# Patient Record
Sex: MALE | Race: BLACK OR AFRICAN AMERICAN | NOT HISPANIC OR LATINO | URBAN - METROPOLITAN AREA
[De-identification: names, ages, dates, MRNs, and addresses within clinical notes are randomized per-mention and may not be internally consistent; named-entity substitution may affect disease eponyms.]

---

## 2017-04-28 ENCOUNTER — OUTPATIENT (OUTPATIENT)
Dept: OUTPATIENT SERVICES | Facility: HOSPITAL | Age: 77
LOS: 1 days | End: 2017-04-28
Payer: MEDICARE

## 2017-04-28 DIAGNOSIS — Z51.89 ENCOUNTER FOR OTHER SPECIFIED AFTERCARE: ICD-10-CM

## 2017-04-28 DIAGNOSIS — M47.816 SPONDYLOSIS WITHOUT MYELOPATHY OR RADICULOPATHY, LUMBAR REGION: ICD-10-CM

## 2017-04-28 DIAGNOSIS — Z98.89 OTHER SPECIFIED POSTPROCEDURAL STATES: Chronic | ICD-10-CM

## 2017-06-16 PROCEDURE — 97010 HOT OR COLD PACKS THERAPY: CPT | Mod: KX

## 2017-06-16 PROCEDURE — 97163 PT EVAL HIGH COMPLEX 45 MIN: CPT

## 2017-06-16 PROCEDURE — G8980: CPT | Mod: CJ

## 2017-06-16 PROCEDURE — 97140 MANUAL THERAPY 1/> REGIONS: CPT | Mod: KX

## 2017-06-16 PROCEDURE — G8979: CPT | Mod: CI

## 2017-06-16 PROCEDURE — 97110 THERAPEUTIC EXERCISES: CPT | Mod: KX

## 2017-06-16 PROCEDURE — G8978: CPT | Mod: CJ

## 2018-02-25 ENCOUNTER — EMERGENCY (EMERGENCY)
Facility: HOSPITAL | Age: 78
LOS: 1 days | Discharge: HOME | End: 2018-02-25
Attending: EMERGENCY MEDICINE | Admitting: EMERGENCY MEDICINE
Payer: MEDICARE

## 2018-02-25 VITALS
RESPIRATION RATE: 18 BRPM | HEART RATE: 70 BPM | OXYGEN SATURATION: 97 % | SYSTOLIC BLOOD PRESSURE: 174 MMHG | TEMPERATURE: 98 F | DIASTOLIC BLOOD PRESSURE: 81 MMHG

## 2018-02-25 VITALS — WEIGHT: 149.91 LBS | HEIGHT: 69 IN

## 2018-02-25 DIAGNOSIS — Z98.89 OTHER SPECIFIED POSTPROCEDURAL STATES: Chronic | ICD-10-CM

## 2018-02-25 LAB
ALBUMIN SERPL ELPH-MCNC: 4.3 G/DL — SIGNIFICANT CHANGE UP (ref 3.3–5.2)
ALP SERPL-CCNC: 80 U/L — SIGNIFICANT CHANGE UP (ref 40–120)
ALT FLD-CCNC: 12 U/L — SIGNIFICANT CHANGE UP
ANION GAP SERPL CALC-SCNC: 10 MMOL/L — SIGNIFICANT CHANGE UP (ref 5–17)
APPEARANCE UR: CLEAR — SIGNIFICANT CHANGE UP
APTT BLD: 39.1 SEC — HIGH (ref 27.5–37.4)
AST SERPL-CCNC: 23 U/L — SIGNIFICANT CHANGE UP
BASOPHILS # BLD AUTO: 0 K/UL — SIGNIFICANT CHANGE UP (ref 0–0.2)
BASOPHILS NFR BLD AUTO: 0.3 % — SIGNIFICANT CHANGE UP (ref 0–2)
BILIRUB SERPL-MCNC: 0.3 MG/DL — LOW (ref 0.4–2)
BILIRUB UR-MCNC: NEGATIVE — SIGNIFICANT CHANGE UP
BLD GP AB SCN SERPL QL: SIGNIFICANT CHANGE UP
BUN SERPL-MCNC: 16 MG/DL — SIGNIFICANT CHANGE UP (ref 8–20)
CALCIUM SERPL-MCNC: 9.9 MG/DL — SIGNIFICANT CHANGE UP (ref 8.6–10.2)
CHLORIDE SERPL-SCNC: 99 MMOL/L — SIGNIFICANT CHANGE UP (ref 98–107)
CO2 SERPL-SCNC: 30 MMOL/L — HIGH (ref 22–29)
COLOR SPEC: YELLOW — SIGNIFICANT CHANGE UP
CREAT SERPL-MCNC: 1.02 MG/DL — SIGNIFICANT CHANGE UP (ref 0.5–1.3)
DIFF PNL FLD: NEGATIVE — SIGNIFICANT CHANGE UP
EOSINOPHIL # BLD AUTO: 0.1 K/UL — SIGNIFICANT CHANGE UP (ref 0–0.5)
EOSINOPHIL NFR BLD AUTO: 1.9 % — SIGNIFICANT CHANGE UP (ref 0–6)
GLUCOSE SERPL-MCNC: 100 MG/DL — SIGNIFICANT CHANGE UP (ref 70–115)
GLUCOSE UR QL: NEGATIVE MG/DL — SIGNIFICANT CHANGE UP
HCT VFR BLD CALC: 46.5 % — SIGNIFICANT CHANGE UP (ref 42–52)
HGB BLD-MCNC: 15.2 G/DL — SIGNIFICANT CHANGE UP (ref 14–18)
INR BLD: 1.09 RATIO — SIGNIFICANT CHANGE UP (ref 0.88–1.16)
KETONES UR-MCNC: NEGATIVE — SIGNIFICANT CHANGE UP
LEUKOCYTE ESTERASE UR-ACNC: NEGATIVE — SIGNIFICANT CHANGE UP
LIDOCAIN IGE QN: 38 U/L — SIGNIFICANT CHANGE UP (ref 22–51)
LYMPHOCYTES # BLD AUTO: 2.2 K/UL — SIGNIFICANT CHANGE UP (ref 1–4.8)
LYMPHOCYTES # BLD AUTO: 37.6 % — SIGNIFICANT CHANGE UP (ref 20–55)
MCHC RBC-ENTMCNC: 29.1 PG — SIGNIFICANT CHANGE UP (ref 27–31)
MCHC RBC-ENTMCNC: 32.7 G/DL — SIGNIFICANT CHANGE UP (ref 32–36)
MCV RBC AUTO: 89.1 FL — SIGNIFICANT CHANGE UP (ref 80–94)
MONOCYTES # BLD AUTO: 0.6 K/UL — SIGNIFICANT CHANGE UP (ref 0–0.8)
MONOCYTES NFR BLD AUTO: 10.5 % — HIGH (ref 3–10)
NEUTROPHILS # BLD AUTO: 2.9 K/UL — SIGNIFICANT CHANGE UP (ref 1.8–8)
NEUTROPHILS NFR BLD AUTO: 49.7 % — SIGNIFICANT CHANGE UP (ref 37–73)
NITRITE UR-MCNC: NEGATIVE — SIGNIFICANT CHANGE UP
PH UR: 7 — SIGNIFICANT CHANGE UP (ref 5–8)
PLATELET # BLD AUTO: 448 K/UL — HIGH (ref 150–400)
POTASSIUM SERPL-MCNC: 5.1 MMOL/L — SIGNIFICANT CHANGE UP (ref 3.5–5.3)
POTASSIUM SERPL-SCNC: 5.1 MMOL/L — SIGNIFICANT CHANGE UP (ref 3.5–5.3)
PROT SERPL-MCNC: 8.1 G/DL — SIGNIFICANT CHANGE UP (ref 6.6–8.7)
PROT UR-MCNC: NEGATIVE MG/DL — SIGNIFICANT CHANGE UP
PROTHROM AB SERPL-ACNC: 12 SEC — SIGNIFICANT CHANGE UP (ref 9.8–12.7)
RBC # BLD: 5.22 M/UL — SIGNIFICANT CHANGE UP (ref 4.6–6.2)
RBC # FLD: 15 % — SIGNIFICANT CHANGE UP (ref 11–15.6)
SODIUM SERPL-SCNC: 139 MMOL/L — SIGNIFICANT CHANGE UP (ref 135–145)
SP GR SPEC: 1 — LOW (ref 1.01–1.02)
TYPE + AB SCN PNL BLD: SIGNIFICANT CHANGE UP
UROBILINOGEN FLD QL: NEGATIVE MG/DL — SIGNIFICANT CHANGE UP
WBC # BLD: 5.8 K/UL — SIGNIFICANT CHANGE UP (ref 4.8–10.8)
WBC # FLD AUTO: 5.8 K/UL — SIGNIFICANT CHANGE UP (ref 4.8–10.8)

## 2018-02-25 PROCEDURE — 85027 COMPLETE CBC AUTOMATED: CPT

## 2018-02-25 PROCEDURE — 36415 COLL VENOUS BLD VENIPUNCTURE: CPT

## 2018-02-25 PROCEDURE — 83690 ASSAY OF LIPASE: CPT

## 2018-02-25 PROCEDURE — 99284 EMERGENCY DEPT VISIT MOD MDM: CPT

## 2018-02-25 PROCEDURE — 71250 CT THORAX DX C-: CPT

## 2018-02-25 PROCEDURE — 99284 EMERGENCY DEPT VISIT MOD MDM: CPT | Mod: 25

## 2018-02-25 PROCEDURE — 74176 CT ABD & PELVIS W/O CONTRAST: CPT

## 2018-02-25 PROCEDURE — 71250 CT THORAX DX C-: CPT | Mod: 26

## 2018-02-25 PROCEDURE — 86901 BLOOD TYPING SEROLOGIC RH(D): CPT

## 2018-02-25 PROCEDURE — 85610 PROTHROMBIN TIME: CPT

## 2018-02-25 PROCEDURE — 85730 THROMBOPLASTIN TIME PARTIAL: CPT

## 2018-02-25 PROCEDURE — 74176 CT ABD & PELVIS W/O CONTRAST: CPT | Mod: 26

## 2018-02-25 PROCEDURE — 80053 COMPREHEN METABOLIC PANEL: CPT

## 2018-02-25 PROCEDURE — 86850 RBC ANTIBODY SCREEN: CPT

## 2018-02-25 PROCEDURE — 86900 BLOOD TYPING SEROLOGIC ABO: CPT

## 2018-02-25 PROCEDURE — 81003 URINALYSIS AUTO W/O SCOPE: CPT

## 2018-02-25 RX ORDER — ACETAMINOPHEN 500 MG
975 TABLET ORAL ONCE
Qty: 0 | Refills: 0 | Status: COMPLETED | OUTPATIENT
Start: 2018-02-25 | End: 2018-02-25

## 2018-02-25 NOTE — ED PROVIDER NOTE - CHPI ED SYMPTOMS NEG
no diarrhea/no hematuria/no chills/no nausea/no blood in stool/no vomiting/no burning urination/no abdominal distension/no fever/no dysuria

## 2018-02-25 NOTE — ED PROVIDER NOTE - OBJECTIVE STATEMENT
76 y/o male c/o mid abdominal pain radiating to the back x 2 days. Denies any cp, sob, palpitations, syncope, diaphoresis, nausea, vomiting, fever, or chills.

## 2018-02-25 NOTE — ED PROVIDER NOTE - ATTENDING CONTRIBUTION TO CARE
76 y/o male c/o mid abdominal pain radiating to the back x 2 days. Denies any cp, sob, palpitations, syncope, diaphoresis, nausea, vomiting, fever, or chills.pe abd mild mid abd tenderness; back nontender;  eval abd obstruction, aorta, kidney stone

## 2018-02-25 NOTE — ED PROVIDER NOTE - MEDICAL DECISION MAKING DETAILS
78 y/o male abdominal pain radiating to the back x 2 days in no apparent distress. PT reports IV dye allergy. CT non contrast shows incarcerated omental fat in area of pain. will tx pain and refer outpatient tx. unlikey aortic dissection as pain is consistent with CT findings and PT is in NAD.

## 2018-03-20 ENCOUNTER — EMERGENCY (EMERGENCY)
Facility: HOSPITAL | Age: 78
LOS: 1 days | Discharge: HOME | End: 2018-03-20
Attending: EMERGENCY MEDICINE | Admitting: EMERGENCY MEDICINE
Payer: MEDICARE

## 2018-03-20 VITALS — WEIGHT: 154.98 LBS | HEIGHT: 69 IN

## 2018-03-20 VITALS
DIASTOLIC BLOOD PRESSURE: 73 MMHG | HEART RATE: 66 BPM | RESPIRATION RATE: 16 BRPM | SYSTOLIC BLOOD PRESSURE: 159 MMHG | TEMPERATURE: 97 F | OXYGEN SATURATION: 99 %

## 2018-03-20 DIAGNOSIS — Z98.89 OTHER SPECIFIED POSTPROCEDURAL STATES: Chronic | ICD-10-CM

## 2018-03-20 PROCEDURE — 99283 EMERGENCY DEPT VISIT LOW MDM: CPT

## 2018-03-20 RX ORDER — AMLODIPINE BESYLATE 2.5 MG/1
5 TABLET ORAL ONCE
Qty: 0 | Refills: 0 | Status: COMPLETED | OUTPATIENT
Start: 2018-03-20 | End: 2018-03-20

## 2018-03-20 RX ORDER — ACETAMINOPHEN 500 MG
975 TABLET ORAL ONCE
Qty: 0 | Refills: 0 | Status: COMPLETED | OUTPATIENT
Start: 2018-03-20 | End: 2018-03-20

## 2018-03-20 RX ADMIN — AMLODIPINE BESYLATE 5 MILLIGRAM(S): 2.5 TABLET ORAL at 17:58

## 2018-03-20 RX ADMIN — Medication 975 MILLIGRAM(S): at 17:58

## 2018-03-20 NOTE — ED ADULT NURSE NOTE - OBJECTIVE STATEMENT
pt with reports of headache x few days with nausea that began today. no vision changes, ambulatory into ED with steady gait. pt is AOX3, ESQUIVEL with strength and purpose. even and unlabored resps present. denies chest pain/SOB. denies numbness/tingling to extremities. pt with reports of headache x few days with nausea that began today. no vision changes, ambulatory into ED with steady gait. pt is AOX3, ESQUIVEL with strength and purpose. even and unlabored resps present. denies chest pain/SOB. denies numbness/tingling to extremities. states he did not tale his BP meds today.

## 2018-03-20 NOTE — ED PROVIDER NOTE - MEDICAL DECISION MAKING DETAILS
Pt with hx of syphilis int the past and no hx of neurosyphilis. He denies active lesions, burning, or painful urination. Will order 5 mg Amlodipine and re-evaluate. Pt with hx of syphilis int the past and no hx of neurosyphilis. He denies active lesions, burning, or painful urination. Will medicate with 5 mg Amlodipine as pt does not know his dosage, treat with Tylenol, and re-evaluate. Pt states that he will follow up with his PMD at [place].

## 2018-03-20 NOTE — ED PROVIDER NOTE - OBJECTIVE STATEMENT
77 year old male, with hx of HTN, presenting to the ED complaining of a 3 day hx of a dull HA that worsened today. Pt states that his pain is radiating from the back of his head down to his neck. He states that he did not take any medication for his pain. Pt denies having any visual changes, numbness, or tingling. He states that he is currently on Amlodipine and was previously on Losartan. Pt states that he did not take his Amlodipine today. Pt notes PSHx of cataract surgery. He denies smoking and states that he is a drinker. 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 He states that his PMD is in [place]. No further complaints at this time. 77 year old male, with hx of HTN, presenting to the ED complaining of a 3 day hx of a dull HA that worsened today. Pt states that his pain is radiating from the back of his head down to his neck. He states that he did not take any medication for his pain. Pt denies having any visual changes, numbness, or tingling. He states that he is currently on Amlodipine and was previously on Losartan. Pt states that he did not take his Amlodipine today and that he does not know the dose that he usually takes. Pt notes PSHx of cataract surgery. He denies smoking and states that he is a drinker. 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 He states that his PMD is in [place]. No further complaints at this time.

## 2018-03-20 NOTE — ED PROVIDER NOTE - PROGRESS NOTE DETAILS
Repeat BP as noted.  Headache improving with Tylenol.  Pt ambulatory in ED with steady gait and is stable for d/c.  Pt instructed to take all meds he may have missed except Amlodipine.  Pt understands that he needs to follow up with his PMD this week for his other concerns

## 2018-05-01 ENCOUNTER — OUTPATIENT (OUTPATIENT)
Dept: OUTPATIENT SERVICES | Facility: HOSPITAL | Age: 78
LOS: 1 days | End: 2018-05-01
Payer: MEDICAID

## 2018-05-01 DIAGNOSIS — Z98.89 OTHER SPECIFIED POSTPROCEDURAL STATES: Chronic | ICD-10-CM

## 2018-05-01 PROCEDURE — G9001: CPT

## 2018-05-03 ENCOUNTER — EMERGENCY (EMERGENCY)
Facility: HOSPITAL | Age: 78
LOS: 1 days | Discharge: HOME | End: 2018-05-03
Attending: EMERGENCY MEDICINE
Payer: MEDICARE

## 2018-05-03 VITALS
TEMPERATURE: 98 F | SYSTOLIC BLOOD PRESSURE: 166 MMHG | DIASTOLIC BLOOD PRESSURE: 79 MMHG | OXYGEN SATURATION: 98 % | HEART RATE: 86 BPM | RESPIRATION RATE: 18 BRPM

## 2018-05-03 VITALS — WEIGHT: 149.91 LBS | HEIGHT: 68 IN

## 2018-05-03 DIAGNOSIS — Z98.89 OTHER SPECIFIED POSTPROCEDURAL STATES: Chronic | ICD-10-CM

## 2018-05-03 PROCEDURE — 99283 EMERGENCY DEPT VISIT LOW MDM: CPT

## 2018-05-03 PROCEDURE — 71046 X-RAY EXAM CHEST 2 VIEWS: CPT

## 2018-05-03 PROCEDURE — 71046 X-RAY EXAM CHEST 2 VIEWS: CPT | Mod: 26

## 2018-05-03 NOTE — ED PROVIDER NOTE - OBJECTIVE STATEMENT
76 yo M PT, PmHX: HTN, DM, heart murmur, chronic nasal congestion, presents to ED complaining of cough and congestion x months. PT states he has had a cough and congestion for months that worsened the last 4 days. PT admits to associated subjective fever and sneezing. PT states he was seen at PMD for similar symptoms 5 days ago and was placed on flonase. PT was given a follow up with Pulmonologist for 5/13/18. PT denies HA, ear pain, throat pain, SOB, CP, N/V/D, chills, and any other acute symptoms at this time.

## 2018-05-03 NOTE — ED PROVIDER NOTE - ATTENDING CONTRIBUTION TO CARE
77y old follow up medical doctor , for uri, returns for follow up, congestion, no sob, no abd pain, patient has been seen for same, vitals reviewed, has a lose follow up, was on abox

## 2018-05-03 NOTE — ED PROVIDER NOTE - ENMT, MLM
Airway patent, Nasal mucosa clear/ pink/moist. Mouth with normal mucosa. Throat has no vesicles, no oropharyngeal exudates and uvula is midline. TM clear no erythema   HEad AT NC

## 2018-05-03 NOTE — ED PROVIDER NOTE - PROGRESS NOTE DETAILS
Acute Ed read of Xray shows no acute fractures/ dislocation. PT aware if secondary reading of imaging changes they will be notified. PT denies fever at this time. PT stable vitals remain stable. PT verbalized understanding of diagnosis and importance of follow up at PMD. PT educated on importance of follow up and when to return to the ED.

## 2018-05-03 NOTE — ED PROVIDER NOTE - RESPIRATORY, MLM
Breath sounds clear and equal bilaterally. no wheezing/rhonchi/rales, no accessory muscle use, no retractions

## 2018-05-09 DIAGNOSIS — R69 ILLNESS, UNSPECIFIED: ICD-10-CM

## 2018-06-01 ENCOUNTER — OUTPATIENT (OUTPATIENT)
Dept: OUTPATIENT SERVICES | Facility: HOSPITAL | Age: 78
LOS: 1 days | End: 2018-06-01
Payer: MEDICARE

## 2018-06-01 DIAGNOSIS — Z51.89 ENCOUNTER FOR OTHER SPECIFIED AFTERCARE: ICD-10-CM

## 2018-06-01 DIAGNOSIS — M54.9 DORSALGIA, UNSPECIFIED: ICD-10-CM

## 2018-06-01 DIAGNOSIS — Z98.89 OTHER SPECIFIED POSTPROCEDURAL STATES: Chronic | ICD-10-CM

## 2018-06-01 DIAGNOSIS — M54.5 LOW BACK PAIN: ICD-10-CM

## 2018-07-03 PROCEDURE — 97010 HOT OR COLD PACKS THERAPY: CPT

## 2018-07-03 PROCEDURE — 97140 MANUAL THERAPY 1/> REGIONS: CPT

## 2018-07-03 PROCEDURE — G8978: CPT | Mod: CJ

## 2018-07-03 PROCEDURE — G8979: CPT | Mod: CI

## 2018-07-03 PROCEDURE — 97110 THERAPEUTIC EXERCISES: CPT

## 2018-07-03 PROCEDURE — 97162 PT EVAL MOD COMPLEX 30 MIN: CPT

## 2018-08-11 ENCOUNTER — EMERGENCY (EMERGENCY)
Facility: HOSPITAL | Age: 78
LOS: 1 days | Discharge: HOME | End: 2018-08-11
Attending: EMERGENCY MEDICINE
Payer: MEDICARE

## 2018-08-11 VITALS
RESPIRATION RATE: 18 BRPM | OXYGEN SATURATION: 100 % | SYSTOLIC BLOOD PRESSURE: 161 MMHG | TEMPERATURE: 99 F | HEART RATE: 78 BPM | DIASTOLIC BLOOD PRESSURE: 76 MMHG

## 2018-08-11 VITALS
TEMPERATURE: 99 F | DIASTOLIC BLOOD PRESSURE: 83 MMHG | HEIGHT: 69 IN | WEIGHT: 153 LBS | HEART RATE: 85 BPM | SYSTOLIC BLOOD PRESSURE: 160 MMHG | OXYGEN SATURATION: 97 % | RESPIRATION RATE: 18 BRPM

## 2018-08-11 DIAGNOSIS — Z98.89 OTHER SPECIFIED POSTPROCEDURAL STATES: Chronic | ICD-10-CM

## 2018-08-11 PROCEDURE — 99283 EMERGENCY DEPT VISIT LOW MDM: CPT

## 2018-08-11 PROCEDURE — 82962 GLUCOSE BLOOD TEST: CPT

## 2018-08-11 RX ORDER — ACETAMINOPHEN 500 MG
975 TABLET ORAL ONCE
Qty: 0 | Refills: 0 | Status: COMPLETED | OUTPATIENT
Start: 2018-08-11 | End: 2018-08-11

## 2018-08-11 RX ORDER — ACETAMINOPHEN 500 MG
2 TABLET ORAL
Qty: 60 | Refills: 0 | OUTPATIENT
Start: 2018-08-11 | End: 2018-08-15

## 2018-08-11 RX ADMIN — Medication 975 MILLIGRAM(S): at 12:19

## 2018-08-11 RX ADMIN — Medication 60 MILLIGRAM(S): at 10:49

## 2018-08-11 NOTE — ED ADULT NURSE NOTE - NSIMPLEMENTINTERV_GEN_ALL_ED
Implemented All Fall Risk Interventions:  [place] to call system. Call bell, personal items and telephone within reach. Instruct patient to call for assistance. Room bathroom lighting operational. Non-slip footwear when patient is off stretcher. Physically safe environment: no spills, clutter or unnecessary equipment. Stretcher in lowest position, wheels locked, appropriate side rails in place. Provide visual cue, wrist band, yellow gown, etc. Monitor gait and stability. Monitor for mental status changes and reorient to person, place, and time. Review medications for side effects contributing to fall risk. Reinforce activity limits and safety measures with patient and family.

## 2018-08-11 NOTE — ED STATDOCS - OBJECTIVE STATEMENT
This is a 78 year old male with c/o L foot pain x 1 day.  He reports inability to place sheet over his great toe.  He notes h/o gout, felt similar symptoms.  He notes took his cholchycine with no relief.  He notes no trauma or falls, fevers, chills, sick contact, recent travel or rashes.

## 2018-08-11 NOTE — ED STATDOCS - ATTENDING CONTRIBUTION TO CARE
78 year old male with c/o L foot pain x 1 day  sheett over great toe not tolerated , swelling and tenderness  steroids

## 2018-08-11 NOTE — ED ADULT NURSE NOTE - OBJECTIVE STATEMENT
patient with multiple medical complaints including abdominal pain, nontender, reports constipation, patient also reports pain to plantar left foot, left knee pain and neck mili

## 2018-08-11 NOTE — ED STATDOCS - PROGRESS NOTE DETAILS
patient given hard sole shoe and cane, refusing to bear weight on LE, PT consulted for evaluation PT reports patient would be candidate for walker, able to walk freely with PT, does not qualify for inpatient re-hab, SW involved in care, patient requesting uric acid level to be drawn, informed patient b/w would not be useful and not change standard of care for tx of gout.  Informed patient will RX prednisone and c/w tylenol, allergy to NSAIDS and narcotics are not the treatment for gout.  Patient understands and agrees to proceed.

## 2018-08-11 NOTE — PHYSICAL THERAPY INITIAL EVALUATION ADULT - ADDITIONAL COMMENTS
Pt reports living alone in an apartment. no stairs in/out. Independent at baseline. Will use axillary crutches or straight cane on occasion, PRN. He reports that axillary crutches cause discomfort and he doesn't want to use them anymore.

## 2018-08-11 NOTE — ED ADULT NURSE NOTE - CHPI ED NUR SYMPTOMS POS
patient complaining of pain to bottom of left foot, no trauma or wounds reported/DIFFICULTY BEARING WEIGHT/PAIN

## 2018-08-11 NOTE — PROVIDER CONTACT NOTE (OTHER) - ASSESSMENT
PT recommendation: Home with no skilled PT needs. Rolling walker for home. CCC: Kaitlin Chauhan notified as well as PA: Alana CRAIG

## 2018-08-11 NOTE — ED STATDOCS - MEDICAL DECISION MAKING DETAILS
L greater toe pain: h/o gout, +TTP along light touch to base of toe, warm, no h/o trauma, took colchycine with no relief, will check FS and give prednisone and have f/u with PCP

## 2018-08-11 NOTE — PROVIDER CONTACT NOTE (OTHER) - BACKGROUND
Pt left sitting in chair at results waiting. NAD. Pt without inpatient skilled PT needs at this time. Will not follow.

## 2018-08-11 NOTE — ED ADULT TRIAGE NOTE - CHIEF COMPLAINT QUOTE
Pt BIBA A&Ox3 c/o left foot pain secondary to gout, states he takes colchicine, took last night. Per ems, pt ambulatory on scene with steady gait. Pt in no apparent distress, voices no further complaints.

## 2018-08-11 NOTE — ED STATDOCS - MUSCULOSKELETAL FINDINGS, MLM
R toe +TTP to light touch, warm, no streaking, slight soft tissue swelling, ROM limited, no ecchymosis, no deformity.

## 2018-08-11 NOTE — PROVIDER CONTACT NOTE (OTHER) - SITUATION
PT orders received. Chart reviewed, contents noted. Pt seen in ED for PT evaluation, see consult for details. Pt with 3/10 pre/post session pain in left foot. Up to 10/10 with weightbearing activities

## 2019-01-29 ENCOUNTER — EMERGENCY (EMERGENCY)
Facility: HOSPITAL | Age: 79
LOS: 1 days | Discharge: HOME | End: 2019-01-29
Attending: EMERGENCY MEDICINE
Payer: MEDICARE

## 2019-01-29 VITALS
HEIGHT: 69 IN | OXYGEN SATURATION: 99 % | RESPIRATION RATE: 20 BRPM | WEIGHT: 153 LBS | DIASTOLIC BLOOD PRESSURE: 83 MMHG | HEART RATE: 90 BPM | TEMPERATURE: 97 F | SYSTOLIC BLOOD PRESSURE: 170 MMHG

## 2019-01-29 DIAGNOSIS — Z98.89 OTHER SPECIFIED POSTPROCEDURAL STATES: Chronic | ICD-10-CM

## 2019-01-29 PROCEDURE — 99283 EMERGENCY DEPT VISIT LOW MDM: CPT

## 2019-01-29 RX ORDER — CETIRIZINE HYDROCHLORIDE 10 MG/1
1 TABLET ORAL
Qty: 7 | Refills: 0 | OUTPATIENT
Start: 2019-01-29 | End: 2019-02-04

## 2019-01-29 RX ORDER — AZITHROMYCIN 500 MG/1
1 TABLET, FILM COATED ORAL
Qty: 1 | Refills: 0 | OUTPATIENT
Start: 2019-01-29 | End: 2019-02-02

## 2019-01-29 NOTE — ED STATDOCS - MEDICAL DECISION MAKING DETAILS
normal vitals, exam conservative treatment for URI safe and stable for discharge remarkably well appearing, normal vitals, normal exam conservative treatment for URI safe and stable for discharge

## 2019-01-29 NOTE — ED STATDOCS - OBJECTIVE STATEMENT
77 y/o M pt with hx of BPH, Diabetes, heart murmur, Gout, HTN, (chronic phlegm since age 9), sleep apnea presents to ED c/o nasal congestion, productive cough with yellow sputum since Saturday night. Was seen by ENT; given nasal spray with relief.  Denies chest pain, fevers, chills, body aches, SOB, chest pain.  No further complaints at this time.

## 2019-01-29 NOTE — ED ADULT TRIAGE NOTE - CHIEF COMPLAINT QUOTE
has nasal spray by ENT and now yellow productive cough, nasal and chest congestion. Worse on inhale. denies cardiac complaints, asking for antibiotics and then DC.

## 2019-01-29 NOTE — ED STATDOCS - CARDIAC, MLM
normal rate, regular rhythm, and  3/6 systolic murmur. normal rate, regular rhythm, and  3/6 systolic murmur. (not new as per patient)

## 2019-02-07 ENCOUNTER — OUTPATIENT (OUTPATIENT)
Dept: OUTPATIENT SERVICES | Facility: HOSPITAL | Age: 79
LOS: 1 days | End: 2019-02-07
Payer: MEDICARE

## 2019-02-07 DIAGNOSIS — Z51.89 ENCOUNTER FOR OTHER SPECIFIED AFTERCARE: ICD-10-CM

## 2019-02-07 DIAGNOSIS — Z98.89 OTHER SPECIFIED POSTPROCEDURAL STATES: Chronic | ICD-10-CM

## 2019-02-07 DIAGNOSIS — M54.5 LOW BACK PAIN: ICD-10-CM

## 2019-02-24 ENCOUNTER — EMERGENCY (EMERGENCY)
Facility: HOSPITAL | Age: 79
LOS: 1 days | Discharge: HOME | End: 2019-02-24
Attending: STUDENT IN AN ORGANIZED HEALTH CARE EDUCATION/TRAINING PROGRAM
Payer: MEDICARE

## 2019-02-24 VITALS
HEART RATE: 86 BPM | OXYGEN SATURATION: 96 % | TEMPERATURE: 99 F | HEIGHT: 69 IN | WEIGHT: 154.98 LBS | SYSTOLIC BLOOD PRESSURE: 138 MMHG | DIASTOLIC BLOOD PRESSURE: 61 MMHG | RESPIRATION RATE: 18 BRPM

## 2019-02-24 DIAGNOSIS — Z98.89 OTHER SPECIFIED POSTPROCEDURAL STATES: Chronic | ICD-10-CM

## 2019-02-24 PROCEDURE — 99283 EMERGENCY DEPT VISIT LOW MDM: CPT

## 2019-02-25 VITALS
SYSTOLIC BLOOD PRESSURE: 124 MMHG | RESPIRATION RATE: 18 BRPM | DIASTOLIC BLOOD PRESSURE: 70 MMHG | HEART RATE: 87 BPM | OXYGEN SATURATION: 97 % | TEMPERATURE: 97 F

## 2019-02-25 PROCEDURE — 99284 EMERGENCY DEPT VISIT MOD MDM: CPT

## 2019-02-25 PROCEDURE — 73502 X-RAY EXAM HIP UNI 2-3 VIEWS: CPT | Mod: 26,RT

## 2019-02-25 PROCEDURE — 73502 X-RAY EXAM HIP UNI 2-3 VIEWS: CPT

## 2019-02-25 PROCEDURE — 72100 X-RAY EXAM L-S SPINE 2/3 VWS: CPT

## 2019-02-25 PROCEDURE — 72100 X-RAY EXAM L-S SPINE 2/3 VWS: CPT | Mod: 26

## 2019-02-25 RX ORDER — LIDOCAINE 4 G/100G
1 CREAM TOPICAL ONCE
Qty: 0 | Refills: 0 | Status: COMPLETED | OUTPATIENT
Start: 2019-02-25 | End: 2019-02-25

## 2019-02-25 RX ORDER — TRAMADOL HYDROCHLORIDE 50 MG/1
1 TABLET ORAL
Qty: 12 | Refills: 0 | OUTPATIENT
Start: 2019-02-25 | End: 2019-02-27

## 2019-02-25 RX ORDER — TRAMADOL HYDROCHLORIDE 50 MG/1
50 TABLET ORAL ONCE
Qty: 0 | Refills: 0 | Status: DISCONTINUED | OUTPATIENT
Start: 2019-02-25 | End: 2019-02-25

## 2019-02-25 RX ORDER — LIDOCAINE 4 G/100G
1 CREAM TOPICAL
Qty: 20 | Refills: 0 | OUTPATIENT
Start: 2019-02-25 | End: 2019-03-16

## 2019-02-25 RX ADMIN — LIDOCAINE 1 PATCH: 4 CREAM TOPICAL at 01:13

## 2019-02-25 RX ADMIN — TRAMADOL HYDROCHLORIDE 50 MILLIGRAM(S): 50 TABLET ORAL at 01:13

## 2019-02-25 NOTE — ED ADULT NURSE NOTE - NSIMPLEMENTINTERV_GEN_ALL_ED
Implemented All Universal Safety Interventions:  [employer] to call system. Call bell, personal items and telephone within reach. Instruct patient to call for assistance. Room bathroom lighting operational. Non-slip footwear when patient is off stretcher. Physically safe environment: no spills, clutter or unnecessary equipment. Stretcher in lowest position, wheels locked, appropriate side rails in place.

## 2019-02-25 NOTE — ED PROVIDER NOTE - CLINICAL SUMMARY MEDICAL DECISION MAKING FREE TEXT BOX
right hip pain x 2 month worse by walking turning - no trauma or fall PT made it worse   lidocane patch - tramadol - xary hip and L spine re eval

## 2019-02-25 NOTE — ED PROVIDER NOTE - PHYSICAL EXAMINATION
Right hip Ant lat and Graether trochanter with mild Bony TTP    LE strength distal purse + 2 at DP , B..l   L spine midline not TTP no para spine muscle TTP    gait normal steady

## 2019-02-25 NOTE — ED ADULT NURSE NOTE - CHPI ED NUR SYMPTOMS NEG
no deformity/no numbness/no abrasion/no bruising/no back pain/no difficulty bearing weight/no fever/no tingling

## 2019-02-25 NOTE — ED PROVIDER NOTE - OBJECTIVE STATEMENT
77 Y/o male PMh of gout , HTn - BPH ad DM presents in Er and  C/o right hip pain x 2 month - pain is around the hip that worse  by walking . states  he had F/u With his PCP at [facility] that refer him to the PT that he said the pain got worsen . states the pain goes to right femor area  W.o any N/t in leg - denies any recent fall or trauma - states he neve had xray done . denies any abdominal pain , N/V/D chest pain Sob palpitations , states he is been told by PCP that take tylenol for the pain but he did not took it    no other compliant

## 2019-02-25 NOTE — ED ADULT NURSE NOTE - OBJECTIVE STATEMENT
patient states that he has right hip pain denies trauma to area, patient in no distress, ambulating without incident, started 2 months ago

## 2019-02-25 NOTE — ED PROVIDER NOTE - ATTENDING CONTRIBUTION TO CARE
79 yo with persistent hip pain. I personally saw the patient with the PA, and completed the key components of the history and physical exam. I then discussed the management plan with the PA.

## 2019-04-16 PROCEDURE — 97010 HOT OR COLD PACKS THERAPY: CPT

## 2019-04-16 PROCEDURE — 97140 MANUAL THERAPY 1/> REGIONS: CPT

## 2019-04-16 PROCEDURE — 97162 PT EVAL MOD COMPLEX 30 MIN: CPT

## 2019-04-16 PROCEDURE — 97110 THERAPEUTIC EXERCISES: CPT

## 2019-07-03 NOTE — ED PROVIDER NOTE - PROGRESS NOTE DETAILS
SHEILA PNA on CT chest, likely 2nd worsened aspiration   -Fevers improved, leukocytosis resolved  -Complete Meropenem today   -Hypoxia resolved, 93% on RA today   -Aspiration precautions  -Advanced dementia  -d/c planning home today seen the pt sit on the cair - states the pain is subsided  xray reviewed by dr Chaney W/o any acute fracture-/ + OA pt been told F/u With pcp and will referral ortho   pt is been told + constipations

## 2019-08-30 ENCOUNTER — EMERGENCY (EMERGENCY)
Facility: HOSPITAL | Age: 79
LOS: 1 days | Discharge: HOME | End: 2019-08-30
Attending: EMERGENCY MEDICINE
Payer: MEDICARE

## 2019-08-30 VITALS
HEIGHT: 68 IN | OXYGEN SATURATION: 99 % | RESPIRATION RATE: 20 BRPM | WEIGHT: 149.91 LBS | SYSTOLIC BLOOD PRESSURE: 161 MMHG | DIASTOLIC BLOOD PRESSURE: 90 MMHG | TEMPERATURE: 98 F | HEART RATE: 80 BPM

## 2019-08-30 DIAGNOSIS — Z98.89 OTHER SPECIFIED POSTPROCEDURAL STATES: Chronic | ICD-10-CM

## 2019-08-30 PROCEDURE — 64400 NJX AA&/STRD TRIGEMINAL NRV: CPT | Mod: LT

## 2019-08-30 PROCEDURE — 99283 EMERGENCY DEPT VISIT LOW MDM: CPT | Mod: 25

## 2019-08-30 PROCEDURE — 64400 NJX AA&/STRD TRIGEMINAL NRV: CPT | Mod: 26

## 2019-08-30 NOTE — ED ADULT TRIAGE NOTE - CHIEF COMPLAINT QUOTE
c/o pain to left side of mouth since last night, when he puts in dentures pain is worse and when he eats pain is worse
n/a

## 2019-08-30 NOTE — ED STATDOCS - CLINICAL SUMMARY MEDICAL DECISION MAKING FREE TEXT BOX
Pain control, dental block, follow up with dental Dental block, follow up with dental Pain likely 2/2 abrasion caused by dentures. No sign of infection. Plan for Dental block, follow up with dental

## 2019-08-30 NOTE — ED STATDOCS - PHYSICAL EXAMINATION
Gen: Well appearing in NAD  Head: NC/AT  ENT: no mandibular pain, tenderness to the buccal surface of the left lower molar, no induration, no swelling, no drainage  Neck: trachea midline  Resp:  No distress  Ext: no deformities  Neuro:  A&O appears non focal  Skin:  Warm and dry as visualized  Psych:  Normal affect and mood Gen: Well appearing in NAD  Head: NC/AT  ENT: no mandibular pain, Multiple missing teeth. tenderness to the buccal surface overlying linear abrasion at the left lower molar, no induration, no swelling, no drainage  Neck: trachea midline  Resp:  No distress  Ext: no deformities  Neuro:  A&O appears non focal  Skin:  Warm and dry as visualized  Psych:  Normal affect and mood

## 2019-08-30 NOTE — ED STATDOCS - OBJECTIVE STATEMENT
Pt is an 80 y/o M, with PMHx of BPH, DM, HTN, presenting to the ED with c/o mouth pain, onset 2-3 days ago. Pt reports a pain in the left side of his mouth/jaw. Reports that pain is worse with attempting to eat and he is having difficulties tolerating PO secondary to pain. He states that he is unable to put in his dentures properly because the pain is worse. No drainage from the left side of the mouth. Denies fever, N/V. Pt states he would have come in last night for eval but reports he fell asleep. Attempting pain relief with OTC meds with minimal relief. Pt is an 78 y/o M, with PMHx of BPH, DM, HTN, presenting to the ED with c/o mouth pain, onset 2-3 days ago. Pt reports a pain in the left side of his mouth/jaw. Reports that pain is worse with attempting to eat and he is having difficulties tolerating PO secondary to pain. He states that he is unable to put in his dentures properly because the pain is worse. Believes the pain is 2/2 the dentures. No drainage from the left side of the mouth. Denies fever, N/V. Pt states he would have come in last night for eval but reports he fell asleep.

## 2019-08-30 NOTE — ED STATDOCS - PATIENT PORTAL LINK FT
You can access the FollowMyHealth Patient Portal offered by [facility] by registering at the following website: [email or web address]. By joining [employer]’s FollowMyHealth portal, you will also be able to view your health information using other applications (apps) compatible with our system.

## 2019-08-30 NOTE — ED STATDOCS - NSFOLLOWUPINSTRUCTIONS_ED_ALL_ED_FT
- Follow up with your dentist with 3-5 days.   - Take Tylenol (Acetaminophen) 650mg or Motrin (Ibuprofen/Advil) 600mg every 6 hours as needed for pain.   - Return to the ED for any new or worsening symptoms including fever, nausea, vomiting.

## 2019-09-01 ENCOUNTER — OUTPATIENT (OUTPATIENT)
Dept: OUTPATIENT SERVICES | Facility: HOSPITAL | Age: 79
LOS: 1 days | End: 2019-09-01
Payer: MEDICARE

## 2019-09-01 DIAGNOSIS — Z98.89 OTHER SPECIFIED POSTPROCEDURAL STATES: Chronic | ICD-10-CM

## 2019-09-01 PROCEDURE — G9001: CPT

## 2019-09-03 DIAGNOSIS — Z71.89 OTHER SPECIFIED COUNSELING: ICD-10-CM

## 2019-11-09 ENCOUNTER — EMERGENCY (EMERGENCY)
Facility: HOSPITAL | Age: 79
LOS: 1 days | Discharge: HOME | End: 2019-11-09
Attending: EMERGENCY MEDICINE
Payer: MEDICARE

## 2019-11-09 VITALS
HEIGHT: 71 IN | WEIGHT: 149.91 LBS | HEART RATE: 62 BPM | DIASTOLIC BLOOD PRESSURE: 78 MMHG | RESPIRATION RATE: 18 BRPM | OXYGEN SATURATION: 99 % | TEMPERATURE: 98 F | SYSTOLIC BLOOD PRESSURE: 155 MMHG

## 2019-11-09 DIAGNOSIS — Z98.89 OTHER SPECIFIED POSTPROCEDURAL STATES: Chronic | ICD-10-CM

## 2019-11-09 PROCEDURE — 99283 EMERGENCY DEPT VISIT LOW MDM: CPT
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
## 2019-11-10 PROCEDURE — 82962 GLUCOSE BLOOD TEST: CPT

## 2019-11-10 PROCEDURE — 99282 EMERGENCY DEPT VISIT SF MDM: CPT

## 2019-11-10 NOTE — ED PROVIDER NOTE - PHYSICAL EXAMINATION
Vital signs noted, see flowsheet.  General: Well nourished/developed. In no acute distress, well appearing and non-toxic.  HEENT: MMM. PERRL.  Neck: Soft and supple  Cardiac: RRR. +S1/S2. Peripheral pulses 2+ and symmetric b/l.  Respiratory: Lungs CTA  Abdomen: Soft, NTND.   Neuro: Awake, alert and oriented to person/place/time/situation. Moves all extremities spontaneously and symmetrically.

## 2019-11-10 NOTE — ED PROVIDER NOTE - DISPOSITION TYPE
- Started on simethicone TID  - C.diff negative  - Requiring imodium for continuous diarrhea  - Will monitor as pt is more ambulatory and will likely improve w/ the transition to general diet; noted mild improvement in his symptoms   DISCHARGE

## 2019-11-10 NOTE — ED PROVIDER NOTE - PATIENT PORTAL LINK FT
You can access the FollowMyHealth Patient Portal offered by [facility] by registering at the following website: [email or web address]. By joining [employer]’s FollowMyHealth portal, you will also be able to view your health information using other applications (apps) compatible with our system.
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
## 2019-11-10 NOTE — ED PROVIDER NOTE - CLINICAL SUMMARY MEDICAL DECISION MAKING FREE TEXT BOX
80 y/o M for blood glucose testing, lost test strips  -Will check blood glucose level  -Will follow up and re-evaluate patient

## 2019-11-15 ENCOUNTER — EMERGENCY (EMERGENCY)
Facility: HOSPITAL | Age: 79
LOS: 1 days | Discharge: HOME | End: 2019-11-15
Attending: STUDENT IN AN ORGANIZED HEALTH CARE EDUCATION/TRAINING PROGRAM
Payer: MEDICARE

## 2019-11-15 VITALS
OXYGEN SATURATION: 100 % | HEART RATE: 89 BPM | RESPIRATION RATE: 18 BRPM | SYSTOLIC BLOOD PRESSURE: 172 MMHG | TEMPERATURE: 99 F | DIASTOLIC BLOOD PRESSURE: 99 MMHG

## 2019-11-15 VITALS
SYSTOLIC BLOOD PRESSURE: 132 MMHG | HEIGHT: 69 IN | RESPIRATION RATE: 18 BRPM | DIASTOLIC BLOOD PRESSURE: 75 MMHG | OXYGEN SATURATION: 100 % | HEART RATE: 86 BPM | WEIGHT: 149.91 LBS | TEMPERATURE: 98 F

## 2019-11-15 DIAGNOSIS — Z98.89 OTHER SPECIFIED POSTPROCEDURAL STATES: Chronic | ICD-10-CM

## 2019-11-15 PROCEDURE — 82962 GLUCOSE BLOOD TEST: CPT

## 2019-11-15 PROCEDURE — 99283 EMERGENCY DEPT VISIT LOW MDM: CPT

## 2019-11-15 RX ADMIN — Medication 40 MILLIGRAM(S): at 02:22

## 2019-11-15 NOTE — ED PROVIDER NOTE - PATIENT PORTAL LINK FT
You can access the FollowMyHealth Patient Portal offered by [facility] by registering at the following website: [email or web address]. By joining [employer]’s FollowMyHealth portal, you will also be able to view your health information using other applications (apps) compatible with our system.

## 2019-11-15 NOTE — ED PROVIDER NOTE - NS ED ROS FT
Gen: denies fever, chills, fatigue, weight loss  Skin: denies rashes, laceration, bruising  HEENT: denies visual changes, ear pain, nasal congestion, throat pain  Respiratory: denies ZARAGOZA, SOB, cough, wheezing  Cardiovascular: denies chest pain, palpitations, diaphoresis, LE edema  MSK: +R foot pain. denies joint swelling, back pain, neck pain  Neuro: denies headache, dizziness, weakness, numbness  Psych: denies anxiety, depression, SI/HI, visual/auditory hallucinations

## 2019-11-15 NOTE — ED ADULT NURSE NOTE - CHPI ED NUR SYMPTOMS NEG
no abrasion/no tingling/no weakness/no numbness/no back pain/no deformity/no stiffness/no bruising/no fever

## 2019-11-15 NOTE — ED PROVIDER NOTE - PHYSICAL EXAMINATION
Const: Awake, alert and oriented. In no acute distress. Well appearing.  HEENT: NC/AT. Moist mucous membranes.  Eyes: No scleral icterus. EOMI.  Cardiac: Regular rate and regular rhythm. +S1/S2. Peripheral pulses 2+ and symmetric. No LE edema.  Resp: Speaking in full sentences. No evidence of respiratory distress. No wheezes, rales or rhonchi.  MSK: No obvious deformity or swelling. FROM at ankles b/l. TTP mid-shaft of 1st MT along medial aspect of right foot. DP pulses 2+ b/l.   Skin: No rashes, abrasions or lacerations.  Neuro: Awake, alert & oriented x 3. Moves all extremities symmetrically. Sensorimotor intact x 4

## 2019-11-15 NOTE — ED PROVIDER NOTE - OBJECTIVE STATEMENT
80 y/o M with PMHx DM (on Metformin), HTN, gout, BPH presents to ED for pain to right foot x 5 days. Pt noting pain to medial aspect of right foot, worse with palpation and ambulating/weightbearing. Pt was seen by PMD on Monday and given colchicine for gout flare. Pt states pain feels similar to past episodes of gout and is in same area, notes usually takes colchicine as he is allergic to ibuprofen, rxn is "passing out". Pt has been taking colchicine since Tuesday evening, 4 doses in total, noting minimal improvement. Pt ambulatory in ED with assistance of crutch. No further complaints. Denies fever, chills, CP, SOB, rash. 78 y/o M with PMHx DM (on Metformin), HTN, gout, BPH presents to ED for pain to right foot x 5 days. Pt noting pain to medial aspect of right foot, worse with palpation and ambulating/weightbearing. Pt was seen by PMD on Monday and given colchicine for gout flare. Pt states pain feels similar to past episodes of gout and is in same area, notes usually takes colchicine as he is allergic to ibuprofen, rxn is "passing out". Pt has been taking colchicine since Tuesday evening, 4 doses in total, noting minimal improvement. No injury or trauma to area. Pt ambulatory in ED with assistance of crutch, able to weight bear. No further complaints. Denies fever, chills, CP, SOB, rash.

## 2019-11-15 NOTE — ED ADULT TRIAGE NOTE - CHIEF COMPLAINT QUOTE
"I have gout and its hurting me".  Pt. complaining of right foot pain and swelling since Sunday.  Pt. states he went to PMD on tuesday and was prescribed Colchicine that is not providing relief.  Swelling noted to medial aspect of right foot.  Pt. able to ambulate independently; pt. has one crutch at side to aid with ambulation.

## 2019-11-15 NOTE — ED ADULT NURSE NOTE - OBJECTIVE STATEMENT
Pt complaining of foot pain/injury. Pt says he was seen by his PMD and treated for gout in his right foot. He says he has been taking the prescribed medication since monday colcichine. He says he knows its gout and it feels the same as the last time he had a flare up. He denies injury to the area.

## 2019-12-09 NOTE — ED PROVIDER NOTE - CLINICAL SUMMARY MEDICAL DECISION MAKING FREE TEXT BOX
Pt in ED for gout flare, currently being treated with colchicine, still noting pain. Pt with hx DM, allergic to ibuprofen. Will tx with 1 dose of prednisone in ED, pt instructed to continue course of colchicine and f/u with rheumatology, referral provided. No indicators present

## 2021-09-07 ENCOUNTER — EMERGENCY (EMERGENCY)
Facility: HOSPITAL | Age: 81
LOS: 1 days | Discharge: HOME | End: 2021-09-07
Attending: EMERGENCY MEDICINE
Payer: MEDICARE

## 2021-09-07 VITALS
DIASTOLIC BLOOD PRESSURE: 76 MMHG | HEART RATE: 72 BPM | TEMPERATURE: 98 F | WEIGHT: 160.06 LBS | SYSTOLIC BLOOD PRESSURE: 168 MMHG | HEIGHT: 69 IN | OXYGEN SATURATION: 97 % | RESPIRATION RATE: 18 BRPM

## 2021-09-07 DIAGNOSIS — Z95.2 PRESENCE OF PROSTHETIC HEART VALVE: Chronic | ICD-10-CM

## 2021-09-07 DIAGNOSIS — Z98.89 OTHER SPECIFIED POSTPROCEDURAL STATES: Chronic | ICD-10-CM

## 2021-09-07 LAB
ALBUMIN SERPL ELPH-MCNC: 4.1 G/DL — SIGNIFICANT CHANGE UP (ref 3.3–5.2)
ALP SERPL-CCNC: 77 U/L — SIGNIFICANT CHANGE UP (ref 40–120)
ALT FLD-CCNC: 17 U/L — SIGNIFICANT CHANGE UP
ANION GAP SERPL CALC-SCNC: 12 MMOL/L — SIGNIFICANT CHANGE UP (ref 5–17)
AST SERPL-CCNC: 40 U/L — HIGH
BASOPHILS # BLD AUTO: 0.03 K/UL — SIGNIFICANT CHANGE UP (ref 0–0.2)
BASOPHILS NFR BLD AUTO: 0.5 % — SIGNIFICANT CHANGE UP (ref 0–2)
BILIRUB SERPL-MCNC: 0.3 MG/DL — LOW (ref 0.4–2)
BUN SERPL-MCNC: 29.3 MG/DL — HIGH (ref 8–20)
CALCIUM SERPL-MCNC: 9.5 MG/DL — SIGNIFICANT CHANGE UP (ref 8.6–10.2)
CHLORIDE SERPL-SCNC: 103 MMOL/L — SIGNIFICANT CHANGE UP (ref 98–107)
CO2 SERPL-SCNC: 24 MMOL/L — SIGNIFICANT CHANGE UP (ref 22–29)
CREAT SERPL-MCNC: 1.19 MG/DL — SIGNIFICANT CHANGE UP (ref 0.5–1.3)
EOSINOPHIL # BLD AUTO: 0.28 K/UL — SIGNIFICANT CHANGE UP (ref 0–0.5)
EOSINOPHIL NFR BLD AUTO: 5.1 % — SIGNIFICANT CHANGE UP (ref 0–6)
GLUCOSE SERPL-MCNC: 136 MG/DL — HIGH (ref 70–99)
HCT VFR BLD CALC: 45.7 % — SIGNIFICANT CHANGE UP (ref 39–50)
HGB BLD-MCNC: 14.5 G/DL — SIGNIFICANT CHANGE UP (ref 13–17)
IMM GRANULOCYTES NFR BLD AUTO: 0.2 % — SIGNIFICANT CHANGE UP (ref 0–1.5)
LYMPHOCYTES # BLD AUTO: 1.86 K/UL — SIGNIFICANT CHANGE UP (ref 1–3.3)
LYMPHOCYTES # BLD AUTO: 33.9 % — SIGNIFICANT CHANGE UP (ref 13–44)
MCHC RBC-ENTMCNC: 29.5 PG — SIGNIFICANT CHANGE UP (ref 27–34)
MCHC RBC-ENTMCNC: 31.7 GM/DL — LOW (ref 32–36)
MCV RBC AUTO: 92.9 FL — SIGNIFICANT CHANGE UP (ref 80–100)
MONOCYTES # BLD AUTO: 0.62 K/UL — SIGNIFICANT CHANGE UP (ref 0–0.9)
MONOCYTES NFR BLD AUTO: 11.3 % — SIGNIFICANT CHANGE UP (ref 2–14)
NEUTROPHILS # BLD AUTO: 2.68 K/UL — SIGNIFICANT CHANGE UP (ref 1.8–7.4)
NEUTROPHILS NFR BLD AUTO: 49 % — SIGNIFICANT CHANGE UP (ref 43–77)
PLATELET # BLD AUTO: 536 K/UL — HIGH (ref 150–400)
POTASSIUM SERPL-MCNC: 5.3 MMOL/L — SIGNIFICANT CHANGE UP (ref 3.5–5.3)
POTASSIUM SERPL-SCNC: 5.3 MMOL/L — SIGNIFICANT CHANGE UP (ref 3.5–5.3)
PROT SERPL-MCNC: 7.4 G/DL — SIGNIFICANT CHANGE UP (ref 6.6–8.7)
RBC # BLD: 4.92 M/UL — SIGNIFICANT CHANGE UP (ref 4.2–5.8)
RBC # FLD: 15.6 % — HIGH (ref 10.3–14.5)
SODIUM SERPL-SCNC: 139 MMOL/L — SIGNIFICANT CHANGE UP (ref 135–145)
TROPONIN T SERPL-MCNC: <0.01 NG/ML — SIGNIFICANT CHANGE UP (ref 0–0.06)
TROPONIN T SERPL-MCNC: <0.01 NG/ML — SIGNIFICANT CHANGE UP (ref 0–0.06)
WBC # BLD: 5.48 K/UL — SIGNIFICANT CHANGE UP (ref 3.8–10.5)
WBC # FLD AUTO: 5.48 K/UL — SIGNIFICANT CHANGE UP (ref 3.8–10.5)

## 2021-09-07 PROCEDURE — 71046 X-RAY EXAM CHEST 2 VIEWS: CPT | Mod: 26

## 2021-09-07 PROCEDURE — 99285 EMERGENCY DEPT VISIT HI MDM: CPT | Mod: GC

## 2021-09-07 PROCEDURE — 93010 ELECTROCARDIOGRAM REPORT: CPT

## 2021-09-07 PROCEDURE — 99284 EMERGENCY DEPT VISIT MOD MDM: CPT | Mod: 25

## 2021-09-07 PROCEDURE — 85025 COMPLETE CBC W/AUTO DIFF WBC: CPT

## 2021-09-07 PROCEDURE — 80053 COMPREHEN METABOLIC PANEL: CPT

## 2021-09-07 PROCEDURE — 93005 ELECTROCARDIOGRAM TRACING: CPT

## 2021-09-07 PROCEDURE — 84484 ASSAY OF TROPONIN QUANT: CPT

## 2021-09-07 PROCEDURE — 36415 COLL VENOUS BLD VENIPUNCTURE: CPT

## 2021-09-07 PROCEDURE — 71046 X-RAY EXAM CHEST 2 VIEWS: CPT

## 2021-09-07 RX ORDER — ACETAMINOPHEN 500 MG
650 TABLET ORAL ONCE
Refills: 0 | Status: COMPLETED | OUTPATIENT
Start: 2021-09-07 | End: 2021-09-07

## 2021-09-07 RX ADMIN — Medication 650 MILLIGRAM(S): at 14:03

## 2021-09-07 NOTE — ED PROVIDER NOTE - ATTENDING CONTRIBUTION TO CARE
81yoM; with pmh signif for HTN, HLD, CHF, AS s/p TAVF, CKD; now p/w chest pain--sscp, non-radiating, s/p moving heavy pot of water to stove yesterday. pain with movement of arms. denies any associated sob, palpitations, lightheadedness, nausea, or diaphoresis. denies cough. denies travel. denies trauma. denies f/c/s.  General:     NAD  Head:     NC/AT, EOMI, oral mucosa moist  Neck:     trachea midline  Lungs:     CTA b/l, no w/r/r  CVS:     S1S2, RRR, no m/g/r  CHEST WALL: ttp over b/l costochondral border. no crepitus  Abd:     +BS, s/nt/nd, no organomegaly  Ext:    2+ radial and pedal pulses, no c/c/e  Neuro: AAOx3, no sensory/motor deficits  A/P:  81yoM p/w atypical chest pain  -cardiac monitor, labs, serial enzymes, f/up with pmd and cardiology

## 2021-09-07 NOTE — ED PROVIDER NOTE - NSFOLLOWUPCLINICS_GEN_ALL_ED_FT
[facility] Cardiology  Cardiology  [address] [place] [address]  Phone: [phone]  Fax:

## 2021-09-07 NOTE — ED ADULT TRIAGE NOTE - MODE OF ARRIVAL
Ongoing SW/CM Assessment/Plan of Care Note     See SW/CM flowsheets for goals and other objective data.    PT Recommendation:     Recommendation for Discharge: PT IL: Patient requires 24 hour nonskilled assistance to perform mobility and/or ADLs safely    OT Recommendation:     Recommendations for Discharge: OT IL: Patient needs daily, skilled therapy for 1-3 hours a day by at least two disciplines    SLP Recommendation:  Recommendations for Discharge: SLP: 24-hour supervision; ongoing tx at next level of care      Progress note:   @ 1440 HH/DME orders noted.  PS'd Dr. Claudio to update HH orders to include enteral feeds and to place Service to Home Pharmacy for patients enteral feeds.  MD notified patient has no PCP, but that TSP was tasked.  Dr. Claudio agreeable to follow pt for HH until patient has PCP visit.    Per RN, pt's brother would like to do another Zoom call with PT/OT.  Discussed with PT and therapy will f/u to coordinate session.  SW updated.         [facility]

## 2021-09-07 NOTE — ED ADULT NURSE NOTE - OBJECTIVE STATEMENT
pt a&ox4, vss, came in w/ complaints of chest discomfort, pt denies sob, ha, n/v/d. respirations even and unlabored. POC discussed w. patient, will continue to monitor.

## 2021-09-07 NOTE — ED PROVIDER NOTE - NS ED ROS FT
Constitutional: no fever, no chills  Head: NC, AT   Eyes: no redness   ENMT: no nasal congestion/drainage, no sore throat   CV: + chest pain, no edema  Resp: no cough, no dyspnea  GI: no abdominal pain, no nausea, no vomiting, no diarrhea, +constipation  : no dysuria, no hematuria   Skin: no lesions, no rashes   Neuro: no LOC, no headache, no sensory deficits, no weakness

## 2021-09-07 NOTE — ED PROVIDER NOTE - CLINICAL SUMMARY MEDICAL DECISION MAKING FREE TEXT BOX
81 year old male with history of HTN, HLD, CHFrEF, AS s/p AVR who presents with chest pain. Chest pain likely 2/2 musculoskeletal strain. Low suspicion for ACS as chest pain reproducible with movement, CXR and EKG unremarkable, trop negative. Pt will be discharged home with return precautions. 81 year old male with history of HTN, HLD, CHFrEF, AS s/p AVR who presents with chest pain. Chest pain likely 2/2 musculoskeletal strain. Low suspicion for ACS as chest pain reproducible with movement, CXR and EKG unremarkable, trop negative x 2 . Pt will be discharged home with return precautions.

## 2021-09-07 NOTE — ED PROVIDER NOTE - OBJECTIVE STATEMENT
81 year old male with history of HTN, HLD, CHFrEF, AS s/p AVR, CKD3, ?PVD, and prostate cancer who presents with chest pain. About 5 months ago the patient underwent AVR at [place]. He went to his f/u appt but has not seen cardiology since. Yesterday the patient was moving buckets of water and subsequently developed sternal chest pain. The chest pain occurs only with movement of his upper extremities. No nausea, shortness of breath, or diaphoresis. He has NOT had COVID vaccine. No hx of CAD or CHF. He is unsure of his family hx. 81 year old male with history of HTN, DM, BPH, AS s/p TAVR, ?PVD, and prostate cancer who presents with chest pain. About 5 months ago the patient underwent AVR at [facility]. He went to his f/u appt but has not seen cardiology since. Yesterday the patient was moving buckets of water and subsequently developed sternal chest pain. The chest pain occurs only with movement of his upper extremities. No nausea, shortness of breath, or diaphoresis. He has NOT had COVID vaccine. No hx of CAD or CHF. He is unsure of his family hx.

## 2021-09-07 NOTE — ED PROVIDER NOTE - PATIENT PORTAL LINK FT
You can access the FollowMyHealth Patient Portal offered by [facility] by registering at the following website: [email or web address]. By joining [employer]’s FollowMyHealth portal, you will also be able to view your health information using other applications (apps) compatible with our system. You can access the FollowMyHealth Patient Portal offered by [facility] by registering at the following website: [email or web address]. By joining [employer]’s FollowMyHealth portal, you will also be able to view your health information using other applications (apps) compatible with our system.

## 2021-09-07 NOTE — ED PROVIDER NOTE - NSFOLLOWUPINSTRUCTIONS_ED_ALL_ED_FT
-Follow up with your primary care physician in the next 1 week   -Take Tylenol (Acetaminophen) 650mg or Motrin (Ibuprofen/Advil) 600mg every 6 hours as needed for pain   -Return if you begin to have new/worse/concerning symptoms     Chest Pain    Chest pain can be caused by many different conditions which may or may not be dangerous. Causes include heartburn, lung infections, heart attack, blood clot in lungs, skin infections, strain or damage to muscle, cartilage, or bones, etc. In addition to a history and physical examination, an electrocardiogram (ECG) or other lab tests may have been performed to determine the cause of your chest pain. Follow up with your primary care provider or with a cardiologist as instructed.     SEEK IMMEDIATE MEDICAL CARE IF YOU HAVE ANY OF THE FOLLOWING SYMPTOMS: worsening chest pain, coughing up blood, unexplained back/neck/jaw pain, severe abdominal pain, dizziness or lightheadedness, fainting, shortness of breath, sweaty or clammy skin, vomiting, or racing heart beat. These symptoms may represent a serious problem that is an emergency. Do not wait to see if the symptoms will go away. Get medical help right away. Call 911 and do not drive yourself to the hospital.

## 2021-09-07 NOTE — ED PROVIDER NOTE - PHYSICAL EXAMINATION
General: well appearing, NAD  Head: NC, AT  EENT: EOMI, no scleral icterus  Cardiac: RRR, no apparent murmurs, no lower extremity edema  Respiratory: CTABL, no respiratory distress   Abdomen: soft, ND, NT, nonperitonitic  MSK/Vascular: full ROM, soft compartments, warm extremities  Neuro: AAOx3, sensation to light touch intact  Psych: calm, cooperative General: well appearing, NAD  Head: NC, AT  EENT: EOMI, no scleral icterus  Cardiac: RRR, no apparent murmurs, no lower extremity edema, reproducible chest wall pain   Respiratory: CTABL, no respiratory distress   Abdomen: soft, ND, NT, nonperitonitic  MSK/Vascular: full ROM, soft compartments, warm extremities  Neuro: AAOx3, sensation to light touch intact  Psych: calm, cooperative

## 2021-09-07 NOTE — ED PROVIDER NOTE - NSICDXPASTMEDICALHX_GEN_ALL_CORE_FT
PAST MEDICAL HISTORY:  BPH (benign prostatic hyperplasia)     Diabetes     Gout attack     Hypertension

## 2021-09-19 ENCOUNTER — EMERGENCY (EMERGENCY)
Facility: HOSPITAL | Age: 81
LOS: 1 days | Discharge: HOME | End: 2021-09-19
Attending: STUDENT IN AN ORGANIZED HEALTH CARE EDUCATION/TRAINING PROGRAM
Payer: MEDICARE

## 2021-09-19 VITALS
OXYGEN SATURATION: 96 % | WEIGHT: 149.91 LBS | HEIGHT: 69 IN | DIASTOLIC BLOOD PRESSURE: 77 MMHG | RESPIRATION RATE: 18 BRPM | HEART RATE: 84 BPM | TEMPERATURE: 99 F | SYSTOLIC BLOOD PRESSURE: 172 MMHG

## 2021-09-19 DIAGNOSIS — Z98.89 OTHER SPECIFIED POSTPROCEDURAL STATES: Chronic | ICD-10-CM

## 2021-09-19 DIAGNOSIS — Z95.2 PRESENCE OF PROSTHETIC HEART VALVE: Chronic | ICD-10-CM

## 2021-09-19 PROCEDURE — 73630 X-RAY EXAM OF FOOT: CPT | Mod: 26,LT

## 2021-09-19 PROCEDURE — 99283 EMERGENCY DEPT VISIT LOW MDM: CPT | Mod: GC

## 2021-09-19 PROCEDURE — 73630 X-RAY EXAM OF FOOT: CPT

## 2021-09-19 PROCEDURE — 99284 EMERGENCY DEPT VISIT MOD MDM: CPT | Mod: 25

## 2021-09-19 PROCEDURE — 73610 X-RAY EXAM OF ANKLE: CPT | Mod: 26,LT

## 2021-09-19 PROCEDURE — 73610 X-RAY EXAM OF ANKLE: CPT

## 2021-09-19 RX ORDER — ACETAMINOPHEN 500 MG
650 TABLET ORAL ONCE
Refills: 0 | Status: COMPLETED | OUTPATIENT
Start: 2021-09-19 | End: 2021-09-19

## 2021-09-19 RX ADMIN — Medication 650 MILLIGRAM(S): at 09:12

## 2021-09-19 NOTE — ED PROVIDER NOTE - NS ED ROS FT
Gen: No fever, normal appetite  Resp: No cough or trouble breathing  Cardiovascular: No chest pain  Gastroenteric: No abd pain  :  No change in urine output  MS: +left ankle pain and swelling; no wound  Skin: No rashes  Neuro: No headache; no abnormal movements  Remainder negative, except as per the HPI

## 2021-09-19 NOTE — ED ADULT TRIAGE NOTE - CHIEF COMPLAINT QUOTE
patient states that he banged his left foot on Sunday complaining of pain swelling and difficulty walking

## 2021-09-19 NOTE — ED PROVIDER NOTE - PHYSICAL EXAMINATION
Gen: no acute distress  Head: normocephalic, atraumatic  EENT: EOMI  Lung: no increased work of breathing; CTABL  CV:  2+ radial pulses b/l  Abd: soft, non-tender, non-distended  MSK:  full range of motion in all 4 extremities; left ankle/foot: +edema, no erythema, no fluctuance, +TTP over medial and posterior ankle, Achilles feels intact, Able to dorsiflex and plantar flex ankle, palpable dorsalis pedis pulse  Neuro: No focal neurologic deficits; sensation intact in left foot  Skin: No rash   Psych: normal affect

## 2021-09-19 NOTE — ED PROVIDER NOTE - CLINICAL SUMMARY MEDICAL DECISION MAKING FREE TEXT BOX
81yM with pmh htn, hld, DM type 2, AVR presenting with 1 week of foot pain/injury. X-rays, RICE, and follow-up with vascular surgery for chronic peripheral vascular disease.

## 2021-09-19 NOTE — ED PROVIDER NOTE - ATTENDING CONTRIBUTION TO CARE
81yM with pmh htn, hld, DM here with left ankle pain after injuring it 1 week ago. Did not seek medical attention at the time. Has been able to walk.   AP - xr eval for fracture. no calf swelling. pain control. outpt f/u

## 2021-09-19 NOTE — ED PROVIDER NOTE - PATIENT PORTAL LINK FT
You can access the FollowMyHealth Patient Portal offered by [facility] by registering at the following website: [email or web address]. By joining [facility]’s FollowMyHealth portal, you will also be able to view your health information using other applications (apps) compatible with our system.

## 2021-09-19 NOTE — ED PROVIDER NOTE - OBJECTIVE STATEMENT
81yM with pmh htn, hld, DM type 2, AVR presenting with 1 week of foot pain/injury. Patient hit left ankle with metal chair. States he "didn't hit my ankle that hard". However reports pain and swelling has gotten worse over past week. Denies wound, fevers, chills, N/V, chest pain, shortness of breath, history of blood clots. Sees vascular surgery at [facility] for PVD. Able to ambulate and not really taking medication for pain.

## 2021-09-19 NOTE — ED ADULT NURSE NOTE - NSIMPLEMENTINTERV_GEN_ALL_ED
Implemented All Fall Risk Interventions:  [place] to call system. Call bell, personal items and telephone within reach. Instruct patient to call for assistance. Room bathroom lighting operational. Non-slip footwear when patient is off stretcher. Physically safe environment: no spills, clutter or unnecessary equipment. Stretcher in lowest position, wheels locked, appropriate side rails in place. Provide visual cue, wrist band, yellow gown, etc. Monitor gait and stability. Monitor for mental status changes and reorient to person, place, and time. Review medications for side effects contributing to fall risk. Reinforce activity limits and safety measures with patient and family.

## 2021-09-19 NOTE — ED PROVIDER NOTE - NSICDXPASTMEDICALHX_GEN_ALL_CORE_FT
PAST MEDICAL HISTORY:  BPH (benign prostatic hyperplasia)     Diabetes     Gout attack     Hypertension     Mild HTN

## 2022-02-05 ENCOUNTER — EMERGENCY (EMERGENCY)
Facility: HOSPITAL | Age: 82
LOS: 1 days | Discharge: HOME | End: 2022-02-05
Attending: EMERGENCY MEDICINE
Payer: MEDICARE

## 2022-02-05 VITALS
HEIGHT: 69 IN | SYSTOLIC BLOOD PRESSURE: 181 MMHG | WEIGHT: 169.98 LBS | OXYGEN SATURATION: 98 % | HEART RATE: 89 BPM | TEMPERATURE: 98 F | RESPIRATION RATE: 18 BRPM | DIASTOLIC BLOOD PRESSURE: 79 MMHG

## 2022-02-05 DIAGNOSIS — Z98.89 OTHER SPECIFIED POSTPROCEDURAL STATES: Chronic | ICD-10-CM

## 2022-02-05 DIAGNOSIS — Z95.2 PRESENCE OF PROSTHETIC HEART VALVE: Chronic | ICD-10-CM

## 2022-02-05 PROCEDURE — 99284 EMERGENCY DEPT VISIT MOD MDM: CPT

## 2022-02-05 RX ORDER — ACETAMINOPHEN 500 MG
650 TABLET ORAL ONCE
Refills: 0 | Status: COMPLETED | OUTPATIENT
Start: 2022-02-05 | End: 2022-02-05

## 2022-02-05 RX ORDER — SODIUM CHLORIDE 9 MG/ML
3 INJECTION INTRAMUSCULAR; INTRAVENOUS; SUBCUTANEOUS ONCE
Refills: 0 | Status: COMPLETED | OUTPATIENT
Start: 2022-02-05 | End: 2022-02-05

## 2022-02-05 NOTE — ED PROVIDER NOTE - NSFOLLOWUPINSTRUCTIONS_ED_ALL_ED_FT
Meclizine 12.5 mg every 8 hrs as needed for dizziness  Follow up with Neurology-call to schedule appt on Monday  Return sooner for any problems

## 2022-02-05 NOTE — ED PROVIDER NOTE - OBJECTIVE STATEMENT
80 y/o male with PMHx of HTN, DM, aortic mechanical valve and BPH presents to the ED c/o dizziness. Patient is a poor historian. Patient reports intermittent worsened dizziness earlier today and later went to his podiatrist for a procedure on his left foot. Denies chest pain, SOB, smoking, abdominal pain, or weakness. Patient is not vaccinated for covid.

## 2022-02-05 NOTE — ED PROVIDER NOTE - PATIENT PORTAL LINK FT
You can access the FollowMyHealth Patient Portal offered by [facility] by registering at the following website: [email or web address]. By joining [employer]’s FollowMyHealth portal, you will also be able to view your health information using other applications (apps) compatible with our system.

## 2022-02-05 NOTE — ED PROVIDER NOTE - CARE PROVIDER_API CALL
Richard Cervantes; PhD)  Neurology; Vascular Neurology  [address] [place] [address]  Phone: [phone]  Fax: [phone]  Follow Up Time:

## 2022-02-05 NOTE — ED PROVIDER NOTE - DR. NAME
March 5, 2020     Patient: Daya Strickland   YOB: 2016   Date of Visit: 3/5/2020       To Whom it May Concern:    Daya Strickland was seen in my clinic on 3/5/2020 at 12:15 pm.     Please excuse Daya Pro for her absence from school on the date listed above to be able to make her appointment. May return to school 3/9/2020.    Sincerely,         Vero Sweet CNP    Medical information is confidential and cannot be disclosed without the written consent of the patient or her representative.      
[place]lem[place]

## 2022-02-05 NOTE — ED PROVIDER NOTE - PROGRESS NOTE DETAILS
Labs and CT results as noted and reviewed with pt.  Pt ambulatory with steady gait with no recurrent dizziness while in ED.  Will Rx Meclizine and give referral to Neurology.  Pt has all his doctors at [facility] and would like to f/u there

## 2022-02-05 NOTE — ED PROVIDER NOTE - SKIN, MLM
Skin normal color for race, warm, dry and intact. No evidence of rash. Skin normal color for race, warm, dry and intact. small round post operative wound, clean, dry, no signs of induration or fluctuance.

## 2022-02-05 NOTE — ED PROVIDER NOTE - MUSCULOSKELETAL, MLM
Spine appears normal, range of motion is not limited, no muscle or joint tenderness, muscle strength equal bilaterally.

## 2022-02-05 NOTE — ED ADULT TRIAGE NOTE - CHIEF COMPLAINT QUOTE
patient states that he had a procedure at foot doctor , complaining of left foot pain and dizziness in am

## 2022-02-06 VITALS
DIASTOLIC BLOOD PRESSURE: 72 MMHG | SYSTOLIC BLOOD PRESSURE: 155 MMHG | TEMPERATURE: 98 F | HEART RATE: 80 BPM | RESPIRATION RATE: 15 BRPM | OXYGEN SATURATION: 98 %

## 2022-02-06 PROBLEM — I10 ESSENTIAL (PRIMARY) HYPERTENSION: Chronic | Status: ACTIVE | Noted: 2021-09-19

## 2022-02-06 LAB
ALBUMIN SERPL ELPH-MCNC: 4.6 G/DL — SIGNIFICANT CHANGE UP (ref 3.3–5.2)
ALP SERPL-CCNC: 91 U/L — SIGNIFICANT CHANGE UP (ref 40–120)
ALT FLD-CCNC: 19 U/L — SIGNIFICANT CHANGE UP
ANION GAP SERPL CALC-SCNC: 11 MMOL/L — SIGNIFICANT CHANGE UP (ref 5–17)
APTT BLD: 36.8 SEC — HIGH (ref 27.5–35.5)
AST SERPL-CCNC: 43 U/L — HIGH
BASOPHILS # BLD AUTO: 0.03 K/UL — SIGNIFICANT CHANGE UP (ref 0–0.2)
BASOPHILS NFR BLD AUTO: 0.4 % — SIGNIFICANT CHANGE UP (ref 0–2)
BILIRUB SERPL-MCNC: 0.2 MG/DL — LOW (ref 0.4–2)
BUN SERPL-MCNC: 32.1 MG/DL — HIGH (ref 8–20)
CALCIUM SERPL-MCNC: 9.2 MG/DL — SIGNIFICANT CHANGE UP (ref 8.6–10.2)
CHLORIDE SERPL-SCNC: 102 MMOL/L — SIGNIFICANT CHANGE UP (ref 98–107)
CK MB CFR SERPL CALC: 9.5 NG/ML — HIGH (ref 0–6.7)
CK SERPL-CCNC: 412 U/L — HIGH (ref 30–200)
CO2 SERPL-SCNC: 28 MMOL/L — SIGNIFICANT CHANGE UP (ref 22–29)
CREAT SERPL-MCNC: 1.4 MG/DL — HIGH (ref 0.5–1.3)
EOSINOPHIL # BLD AUTO: 0.22 K/UL — SIGNIFICANT CHANGE UP (ref 0–0.5)
EOSINOPHIL NFR BLD AUTO: 3.3 % — SIGNIFICANT CHANGE UP (ref 0–6)
GLUCOSE SERPL-MCNC: 101 MG/DL — HIGH (ref 70–99)
HCT VFR BLD CALC: 47.3 % — SIGNIFICANT CHANGE UP (ref 39–50)
HGB BLD-MCNC: 15.2 G/DL — SIGNIFICANT CHANGE UP (ref 13–17)
IMM GRANULOCYTES NFR BLD AUTO: 0.3 % — SIGNIFICANT CHANGE UP (ref 0–1.5)
INR BLD: 1.05 RATIO — SIGNIFICANT CHANGE UP (ref 0.88–1.16)
LYMPHOCYTES # BLD AUTO: 2.03 K/UL — SIGNIFICANT CHANGE UP (ref 1–3.3)
LYMPHOCYTES # BLD AUTO: 30 % — SIGNIFICANT CHANGE UP (ref 13–44)
MCHC RBC-ENTMCNC: 29.2 PG — SIGNIFICANT CHANGE UP (ref 27–34)
MCHC RBC-ENTMCNC: 32.1 GM/DL — SIGNIFICANT CHANGE UP (ref 32–36)
MCV RBC AUTO: 90.8 FL — SIGNIFICANT CHANGE UP (ref 80–100)
MONOCYTES # BLD AUTO: 0.64 K/UL — SIGNIFICANT CHANGE UP (ref 0–0.9)
MONOCYTES NFR BLD AUTO: 9.5 % — SIGNIFICANT CHANGE UP (ref 2–14)
NEUTROPHILS # BLD AUTO: 3.82 K/UL — SIGNIFICANT CHANGE UP (ref 1.8–7.4)
NEUTROPHILS NFR BLD AUTO: 56.5 % — SIGNIFICANT CHANGE UP (ref 43–77)
PLATELET # BLD AUTO: 512 K/UL — HIGH (ref 150–400)
POTASSIUM SERPL-MCNC: 4.9 MMOL/L — SIGNIFICANT CHANGE UP (ref 3.5–5.3)
POTASSIUM SERPL-SCNC: 4.9 MMOL/L — SIGNIFICANT CHANGE UP (ref 3.5–5.3)
PROT SERPL-MCNC: 7.7 G/DL — SIGNIFICANT CHANGE UP (ref 6.6–8.7)
PROTHROM AB SERPL-ACNC: 12.2 SEC — SIGNIFICANT CHANGE UP (ref 10.6–13.6)
RBC # BLD: 5.21 M/UL — SIGNIFICANT CHANGE UP (ref 4.2–5.8)
RBC # FLD: 15.8 % — HIGH (ref 10.3–14.5)
SODIUM SERPL-SCNC: 141 MMOL/L — SIGNIFICANT CHANGE UP (ref 135–145)
TROPONIN T SERPL-MCNC: 0.01 NG/ML — SIGNIFICANT CHANGE UP (ref 0–0.06)
WBC # BLD: 6.76 K/UL — SIGNIFICANT CHANGE UP (ref 3.8–10.5)
WBC # FLD AUTO: 6.76 K/UL — SIGNIFICANT CHANGE UP (ref 3.8–10.5)

## 2022-02-06 PROCEDURE — 85025 COMPLETE CBC W/AUTO DIFF WBC: CPT

## 2022-02-06 PROCEDURE — 93005 ELECTROCARDIOGRAM TRACING: CPT

## 2022-02-06 PROCEDURE — 85730 THROMBOPLASTIN TIME PARTIAL: CPT

## 2022-02-06 PROCEDURE — 80053 COMPREHEN METABOLIC PANEL: CPT

## 2022-02-06 PROCEDURE — 82553 CREATINE MB FRACTION: CPT

## 2022-02-06 PROCEDURE — 70450 CT HEAD/BRAIN W/O DYE: CPT | Mod: 26,MA

## 2022-02-06 PROCEDURE — 93010 ELECTROCARDIOGRAM REPORT: CPT

## 2022-02-06 PROCEDURE — 82550 ASSAY OF CK (CPK): CPT

## 2022-02-06 PROCEDURE — 85610 PROTHROMBIN TIME: CPT

## 2022-02-06 PROCEDURE — 99285 EMERGENCY DEPT VISIT HI MDM: CPT | Mod: 25

## 2022-02-06 PROCEDURE — 36415 COLL VENOUS BLD VENIPUNCTURE: CPT

## 2022-02-06 PROCEDURE — 90715 TDAP VACCINE 7 YRS/> IM: CPT

## 2022-02-06 PROCEDURE — 70450 CT HEAD/BRAIN W/O DYE: CPT | Mod: MA

## 2022-02-06 PROCEDURE — 84484 ASSAY OF TROPONIN QUANT: CPT

## 2022-02-06 PROCEDURE — 90471 IMMUNIZATION ADMIN: CPT

## 2022-02-06 RX ORDER — TETANUS TOXOID, REDUCED DIPHTHERIA TOXOID AND ACELLULAR PERTUSSIS VACCINE, ADSORBED 5; 2.5; 8; 8; 2.5 [IU]/.5ML; [IU]/.5ML; UG/.5ML; UG/.5ML; UG/.5ML
0.5 SUSPENSION INTRAMUSCULAR ONCE
Refills: 0 | Status: COMPLETED | OUTPATIENT
Start: 2022-02-06 | End: 2022-02-06

## 2022-02-06 RX ORDER — MECLIZINE HCL 12.5 MG
1 TABLET ORAL
Qty: 15 | Refills: 0
Start: 2022-02-06 | End: 2022-02-10

## 2022-02-06 RX ADMIN — Medication 650 MILLIGRAM(S): at 00:06

## 2022-02-06 RX ADMIN — SODIUM CHLORIDE 3 MILLILITER(S): 9 INJECTION INTRAMUSCULAR; INTRAVENOUS; SUBCUTANEOUS at 00:06

## 2022-02-06 RX ADMIN — TETANUS TOXOID, REDUCED DIPHTHERIA TOXOID AND ACELLULAR PERTUSSIS VACCINE, ADSORBED 0.5 MILLILITER(S): 5; 2.5; 8; 8; 2.5 SUSPENSION INTRAMUSCULAR at 01:46

## 2022-02-06 NOTE — ED ADULT NURSE NOTE - HISTORY OF COVID-19 VACCINATION
Ventricular Rate : 85   Atrial Rate : 85   P-R Interval : 156   QRS Duration : 86   Q-T Interval : 372   QTC Calculation(Bezet) : 442   P Axis : 82   R Axis : 74   T Axis : 67   Diagnosis : Normal sinus rhythm~Normal ECG~When compared with ECG of 07-DEC-2010 01:12,~No significant change was found~Confirmed by CRISTA HERNANDEZ M.D. ([id number]),  MALENA HERNANDEZ ([id number]) on 4/14/2017 8:40:01 AM     
No

## 2022-02-06 NOTE — ED ADULT NURSE NOTE - OBJECTIVE STATEMENT
Pt is alert and oriented. Pt states that he was dizzy this morning. Pt states that he then went to the foot doctor to remove a "growth" on the bottom of his foot. Pt states that he is having pain in the left foot. Pt denies any other episodes of dizziness. Pt denies sob, chest pain, nausea, vomiting, and dizziness. Pt resp are even and unlabored, skin color homer for race. Pt updated on plan of care.

## 2022-02-27 ENCOUNTER — EMERGENCY (EMERGENCY)
Facility: HOSPITAL | Age: 82
LOS: 1 days | Discharge: HOME | End: 2022-02-27
Attending: EMERGENCY MEDICINE
Payer: MEDICARE

## 2022-02-27 VITALS
OXYGEN SATURATION: 97 % | TEMPERATURE: 98 F | WEIGHT: 154.98 LBS | RESPIRATION RATE: 18 BRPM | DIASTOLIC BLOOD PRESSURE: 80 MMHG | SYSTOLIC BLOOD PRESSURE: 191 MMHG | HEART RATE: 83 BPM | HEIGHT: 69 IN

## 2022-02-27 DIAGNOSIS — Z98.89 OTHER SPECIFIED POSTPROCEDURAL STATES: Chronic | ICD-10-CM

## 2022-02-27 DIAGNOSIS — Z95.2 PRESENCE OF PROSTHETIC HEART VALVE: Chronic | ICD-10-CM

## 2022-02-27 PROCEDURE — 99284 EMERGENCY DEPT VISIT MOD MDM: CPT | Mod: 25

## 2022-02-27 PROCEDURE — 99284 EMERGENCY DEPT VISIT MOD MDM: CPT

## 2022-02-27 PROCEDURE — 93971 EXTREMITY STUDY: CPT

## 2022-02-27 PROCEDURE — 73590 X-RAY EXAM OF LOWER LEG: CPT

## 2022-02-27 PROCEDURE — 73590 X-RAY EXAM OF LOWER LEG: CPT | Mod: 26,LT

## 2022-02-27 PROCEDURE — 93971 EXTREMITY STUDY: CPT | Mod: 26,LT

## 2022-02-27 NOTE — ED STATDOCS - PATIENT PORTAL LINK FT
You can access the FollowMyHealth Patient Portal offered by [facility] by registering at the following website: [email or web address]. By joining [employer]’s FollowMyHealth portal, you will also be able to view your health information using other applications (apps) compatible with our system.

## 2022-02-27 NOTE — ED STATDOCS - NSFOLLOWUPINSTRUCTIONS_ED_ALL_ED_FT
- Elevate extremities.   - Please bring all documentation from your ED visit to any related future follow up appointment.  - Please call to schedule follow up appointment with your primary care physician within 24-48 hours.  - Please seek immediate medical attention or return to the ED for any new/worsening, signs/symptoms, or concerns.    Feel better!       Peripheral Edema       Peripheral edema is swelling that is caused by a buildup of fluid. Peripheral edema most often affects the lower legs, ankles, and feet. It can also develop in the arms, hands, and face. The area of the body that has peripheral edema will look swollen. It may also feel heavy or warm. Your clothes may start to feel tight. Pressing on the area may make a temporary dent in your skin. You may not be able to move your swollen arm or leg as much as usual.    There are many causes of peripheral edema. It can happen because of a complication of other conditions such as congestive heart failure, kidney disease, or a problem with your blood circulation. It also can be a side effect of certain medicines or because of an infection. It often happens to women during pregnancy. Sometimes, the cause is not known.      Follow these instructions at home:      Managing pain, stiffness, and swelling      •Raise (elevate) your legs while you are sitting or lying down.      •Move around often to prevent stiffness and to lessen swelling.      • Do not sit or stand for long periods of time.      •Wear support stockings as told by your health care provider.      Medicines     •Take over-the-counter and prescription medicines only as told by your health care provider.      •Your health care provider may prescribe medicine to help your body get rid of excess water (diuretic).      General instructions     •Pay attention to any changes in your symptoms.      •Follow instructions from your health care provider about limiting salt (sodium) in your diet. Sometimes, eating less salt may reduce swelling.      •Moisturize skin daily to help prevent skin from cracking and draining.      •Keep all follow-up visits as told by your health care provider. This is important.        Contact a health care provider if you have:    •A fever.      •Edema that starts suddenly or is getting worse, especially if you are pregnant or have a medical condition.      •Swelling in only one leg.      •Increased swelling, redness, or pain in one or both of your legs.      •Drainage or sores at the area where you have edema.        Get help right away if you:    •Develop shortness of breath, especially when you are lying down.      •Have pain in your chest or abdomen.      •Feel weak.      •Feel faint.        Summary    •Peripheral edema is swelling that is caused by a buildup of fluid. Peripheral edema most often affects the lower legs, ankles, and feet.      •Move around often to prevent stiffness and to lessen swelling. Do not sit or stand for long periods of time.      •Pay attention to any changes in your symptoms.      •Contact a health care provider if you have edema that starts suddenly or is getting worse, especially if you are pregnant or have a medical condition.      •Get help right away if you develop shortness of breath, especially when lying down.      This information is not intended to replace advice given to you by your health care provider. Make sure you discuss any questions you have with your health care provider. - Elevate extremities.   - Please bring all documentation from your ED visit to any related future follow up appointment.  - Please call to schedule follow up appointment with your primary care physician within 24-48 hours.  - Please seek immediate medical attention or return to the ED for any new/worsening, signs/symptoms, or concerns.    Feel better!

## 2022-02-27 NOTE — ED STATDOCS - MUSCULOSKELETAL, MLM
range of motion is not limited and there is no muscle tenderness. +mild tenderness in lower extremity 2cm proximal to medial malleolus. No swelling.

## 2022-02-27 NOTE — ED STATDOCS - OBJECTIVE STATEMENT
81 year old male with PMHx of BPH (benign prostatic hyperplasia), Diabetes, Gout, and HTN present to te ED for sudden onset of pain and swelling in lower extremity 2cm proximal to medial malleolus. He lifted his leg in the air and swelling resolved. stent put in his proximal lower left extremity. No Hx of trauma. Pt is on Plavix and Asprin. Pt reports pain is secondary to back support sling, Neoprene.

## 2022-02-27 NOTE — ED STATDOCS - PROGRESS NOTE DETAILS
BLAINE Matthew: Patient evaluated by intake physician. HPI/ROS/PE as noted above. Will follow up plan per intake physician and continue to assess patient.

## 2022-02-27 NOTE — ED ADULT TRIAGE NOTE - CHIEF COMPLAINT QUOTE
patient states that he has swelling to left lower extremity, difficulty walking in am/ using a HEMP product

## 2022-02-27 NOTE — ED ADULT TRIAGE NOTE - TEMPERATURE IN FAHRENHEIT (DEGREES F)
Reason For Visit    Patient presents for device check follow-up. Up. Patient presents for device check follow-up. Pt has a dual chamber ICD implanted 8/16/2016, hx of polymorphic VT, CHF, CMPY. Remote transmission received.           Procedure      Please see scanned Cardiology RN note for more information in Cardiology Tests.    Name and date of birth verified. Remote transmission received. Battery remaining: 10.5 years. Presenting rhythm shows: SR 60. All measurements WNL. Mode switch episodes: none. No arrhythmias detected. Respiratory rate trend indicates euvolemia. Pt. notified of results. Pt. denies CP, SOB, dizziness, palpitations. Three month ICD check and Dr. Ch appt. scheduled and given to patient, 1/18/18.      Allergies   1. ACE Inhibitors    Current Meds   1. Atorvastatin Calcium 80 MG Oral Tablet; TAKE 1 TABLET BY MOUTH EVERY DAY    Requested for: 05Jun2017; Last Rx:[id number] Ordered   2. AmLODIPine Besylate 10 MG Oral Tablet; TAKE 1 TABLET BY MOUTH DAILY;   Therapy: 68Qyi6025 to (Evaluate:30Slz8052)  Requested for: 59Hew1458; Last   Rx:[id number] Ordered   3. AmLODIPine Besylate 5 MG Oral Tablet; 1QD - TAKE ONE TABLET BY MOUTH EVERY   DAY;   Therapy: 34Iyj2585 to (Evaluate:01Vit3145)  Requested for: 97Wqd7242; Last   Rx:[id number] Ordered   4. Aspirin 81 MG Oral Tablet Delayed Release; TAKE 1 TABLET BY MOUTH   EVERY DAY;   Therapy: 96Ypp8884 to (Evaluate:18Eqz2404)  Requested for: 95Yip1083; Last   Rx:[id number] Ordered   5. Carvedilol 25 MG Oral Tablet; TAKE 1 TABLET BY MOUTH TWICE DAILY WITH MEALS;   Therapy: 93Uru2768 to (Evaluate:13Sck5315)  Requested for: 85Uzy9773; Last   Rx:[id number] Ordered    Signatures   Electronically signed by : Patricia Hope R.N.; Oct 11 2017  7:44PM CST    Electronically signed by : JOHNSON MENESES M.D.; Oct 12 2017 10:34AM CST    
98.4

## 2022-04-12 NOTE — ED PROVIDER NOTE - RELIEVING FACTORS
MRI showed evidence of acute osteomyelitis of L medial malleolus and distal aspect of R 1st toe ,poa .   -ID recommended  cefazolin 2g-2g-3g with HD x 4 weeks  -upon discharge, recommend weekly CBC with diff, CMP, ESR and CRP  -wound care  -can follow up with [facility] at [facility] prior to finishing antibiotic course none

## 2022-10-09 ENCOUNTER — EMERGENCY (EMERGENCY)
Facility: HOSPITAL | Age: 82
LOS: 1 days | Discharge: HOME | End: 2022-10-09
Attending: STUDENT IN AN ORGANIZED HEALTH CARE EDUCATION/TRAINING PROGRAM
Payer: MEDICARE

## 2022-10-09 VITALS
TEMPERATURE: 98 F | OXYGEN SATURATION: 97 % | WEIGHT: 149.91 LBS | SYSTOLIC BLOOD PRESSURE: 152 MMHG | DIASTOLIC BLOOD PRESSURE: 71 MMHG | HEART RATE: 95 BPM | HEIGHT: 69 IN | RESPIRATION RATE: 16 BRPM

## 2022-10-09 DIAGNOSIS — Z95.2 PRESENCE OF PROSTHETIC HEART VALVE: Chronic | ICD-10-CM

## 2022-10-09 DIAGNOSIS — Z98.89 OTHER SPECIFIED POSTPROCEDURAL STATES: Chronic | ICD-10-CM

## 2022-10-09 PROCEDURE — 99284 EMERGENCY DEPT VISIT MOD MDM: CPT | Mod: FS

## 2022-10-09 NOTE — ED ADULT TRIAGE NOTE - CHIEF COMPLAINT QUOTE
Ambulatory to ED c/o lower back pain x1 week. Patient states he has a history of chronic back pain, last received cortisone injection at [facility] w/ relief. Patient denies recent trauma/falls/injury to region, ambulating in triage w/ one crutch.

## 2022-10-10 PROCEDURE — 72131 CT LUMBAR SPINE W/O DYE: CPT | Mod: 26,MA

## 2022-10-10 PROCEDURE — 72131 CT LUMBAR SPINE W/O DYE: CPT | Mod: MA

## 2022-10-10 PROCEDURE — 99284 EMERGENCY DEPT VISIT MOD MDM: CPT | Mod: 25

## 2022-10-10 RX ORDER — ACETAMINOPHEN 500 MG
975 TABLET ORAL ONCE
Refills: 0 | Status: COMPLETED | OUTPATIENT
Start: 2022-10-10 | End: 2022-10-10

## 2022-10-10 RX ORDER — METHOCARBAMOL 500 MG/1
1500 TABLET, FILM COATED ORAL ONCE
Refills: 0 | Status: COMPLETED | OUTPATIENT
Start: 2022-10-10 | End: 2022-10-10

## 2022-10-10 RX ORDER — METHOCARBAMOL 500 MG/1
2 TABLET, FILM COATED ORAL
Qty: 18 | Refills: 0
Start: 2022-10-10 | End: 2022-10-12

## 2022-10-10 RX ADMIN — Medication 975 MILLIGRAM(S): at 01:00

## 2022-10-10 RX ADMIN — METHOCARBAMOL 1500 MILLIGRAM(S): 500 TABLET, FILM COATED ORAL at 01:00

## 2022-10-10 NOTE — ED PROVIDER NOTE - NSFOLLOWUPINSTRUCTIONS_ED_ALL_ED_FT
- Follow up with Spine (Dr. santos)  - Follow up with primary care  - Take medication as directed  - Take tylenol 650mg every 6 hours as needed for pain    Back Pain    Back pain is very common in adults. The cause of back pain is rarely dangerous and the pain often gets better over time. The cause of your back pain may not be known and may include strain of muscles or ligaments, degeneration of the spinal disks, or arthritis. Occasionally the pain may radiate down your leg(s). Over-the-counter medicines to reduce pain and inflammation are often the most helpful. Stretching and remaining active frequently helps the healing process.     SEEK IMMEDIATE MEDICAL CARE IF YOU HAVE ANY OF THE FOLLOWING SYMPTOMS: bowel or bladder control problems, unusual weakness or numbness in your arms or legs, nausea or vomiting, abdominal pain, fever, dizziness/lightheadedness.

## 2022-10-10 NOTE — ED PROVIDER NOTE - NS ED ROS FT
Gen: denies fever, chills, fatigue, weight loss  Skin: denies rashes, laceration, bruising  HEENT: denies visual changes, ear pain, nasal congestion, throat pain  Respiratory: denies ZARAGOZA, SOB, cough, wheezing  Cardiovascular: denies chest pain, palpitations, diaphoresis, LE edema  GI: denies abdominal pain, n/v/d  : denies dysuria, frequency, urgency, bowel/bladder incontinence  MSK: Admits lower back pain without radiation. denies joint swelling/pain, neck pain  Neuro: denies headache, dizziness, weakness, numbness

## 2022-10-10 NOTE — ED PROVIDER NOTE - ATTENDING APP SHARED VISIT CONTRIBUTION OF CARE
Alexis GZUMAN:  I have fully participated in the care of this patient. I have made amendments to the documentation where appropriate and otherwise agree with the history, physical exam, and plan as documented by the ACP. My brief assessment is as follows:    82y M w/ hx chronic back pain, presented with 1 week of low back pain. +Midline lumbar tenderness, with nonfocal neuro exam. No red flag signs/symptoms. CT showing no emergent findings. Pt improved with symptomatic treatment and was discharged in stable condition.

## 2022-10-10 NOTE — ED PROVIDER NOTE - OBJECTIVE STATEMENT
82y old male PMHx chronic back pain, HTN, DM, HLD, BPH, presents for new lower back pain x 1 week. Pt reports old lumbar injury last treated 8 years ago at [facility]. Pt reports one week ago cleaning around house suffering atraumatic back pain. pt reports pain has not decreased and feels like it is swollen and tender on the spine. Pt denies pmhx malignancy, IV drug use, fever, chills, saddle anesthesia incontinence.

## 2022-10-10 NOTE — ED PROVIDER NOTE - NS ED ATTENDING STATEMENT MOD
This was a shared visit with the REAL. I reviewed and verified the documentation and independently performed the documented:

## 2022-10-10 NOTE — ED PROVIDER NOTE - CARE PROVIDER_API CALL
Guille Napier)  Neurosurgery  [address], [facility] [address]  [place] [address]  Phone: [phone]  Fax: [phone]  Follow Up Time:

## 2022-10-10 NOTE — ED PROVIDER NOTE - PHYSICAL EXAMINATION
Gen: No acute distress, non toxic  HEENT: Mucous membranes moist, pink conjunctivae, EOMI. PERRL. Airway patent.   CV: RRR, nl s1/s2.  Resp: CTAB, normal rate and effort. No wheezes, rhonchi, or crackles.   GI: Abdomen soft, NT, ND. No rebound, no guarding  Neuro: A&O x4, MAEx4. 5/5 str ext x 4. Sensation intact, symmetric throughout. No FND's. Gait intact. CN 2-12 intact.   MSK:  +Midline spinal ttp lumabr region. No visualized or palpable deformities.  Skin: No rashes, skin intact and well perfused. Cap refill <2sec  Vascular: Radial and dorsalis pedal pulses 2+ b/l

## 2023-02-24 ENCOUNTER — EMERGENCY (EMERGENCY)
Facility: HOSPITAL | Age: 83
LOS: 1 days | Discharge: HOME | End: 2023-02-24
Attending: STUDENT IN AN ORGANIZED HEALTH CARE EDUCATION/TRAINING PROGRAM
Payer: MEDICARE

## 2023-02-24 VITALS
OXYGEN SATURATION: 99 % | HEART RATE: 86 BPM | SYSTOLIC BLOOD PRESSURE: 173 MMHG | TEMPERATURE: 99 F | DIASTOLIC BLOOD PRESSURE: 79 MMHG | WEIGHT: 149.91 LBS | RESPIRATION RATE: 18 BRPM | HEIGHT: 68 IN

## 2023-02-24 DIAGNOSIS — Z98.89 OTHER SPECIFIED POSTPROCEDURAL STATES: Chronic | ICD-10-CM

## 2023-02-24 DIAGNOSIS — Z95.2 PRESENCE OF PROSTHETIC HEART VALVE: Chronic | ICD-10-CM

## 2023-02-24 PROCEDURE — 99283 EMERGENCY DEPT VISIT LOW MDM: CPT | Mod: FS

## 2023-02-24 PROCEDURE — 99282 EMERGENCY DEPT VISIT SF MDM: CPT

## 2023-02-24 RX ORDER — ACETAMINOPHEN 500 MG
650 TABLET ORAL ONCE
Refills: 0 | Status: COMPLETED | OUTPATIENT
Start: 2023-02-24 | End: 2023-02-24

## 2023-02-24 RX ADMIN — Medication 650 MILLIGRAM(S): at 04:50

## 2023-02-24 NOTE — ED PROVIDER NOTE - PHYSICAL EXAMINATION
Gen: No acute distress, non toxic  HENT: clear rhinorrhea, Mucous membranes moist, Oropharynx without exudates, uvula midline  Eyes: pink conjunctivae, EOMI, PERRL  CV: RRR, nl s1/s2.  Resp: CTAB, normal rate and effort

## 2023-02-24 NOTE — ED PROVIDER NOTE - OBJECTIVE STATEMENT
81yo M PMH seasonal allergies, presents to ED c/o nasal congestion and L sided nose pain x3h. pt reports "problems with sinuses" for years and believes the cause is his c-pap machine. he reports coming to ED bc he became worried when we was woken out of sleep from nose pain today. sinus pain accompanied by sneezing x1d. pt is requesting abx. pt denies cough, runny nose, fever, NVD, body aches. pt f/u with ENT and rx Flonase. reports relief then return of sx.

## 2023-02-24 NOTE — ED PROVIDER NOTE - PATIENT PORTAL LINK FT
You can access the FollowMyHealth Patient Portal offered by [facility] by registering at the following website: [email or web address]. By joining [employer]’s FollowMyHealth portal, you will also be able to view your health information using other applications (apps) compatible with our system.

## 2023-02-24 NOTE — ED ADULT NURSE NOTE - OBJECTIVE STATEMENT
c/o nasal congestion and L sided nose pain x 3 hours. PMH seasonal allergies, sinus congestion. Pt denies cough, runny nose, fever, chills, N/V/D, CP, palpitations vision changes. Pt AOx4, speaking coherently, respirations even and unlabored on RA, skin warm and dry.

## 2023-02-24 NOTE — ED ADULT TRIAGE NOTE - HEIGHT IN FEET
[facility]  [address] [address]               Candace Tsang   2017  1:08 AM   ED    Description:  Female : 1994   Department:  [facility]           Your Care was Coordinated By:     Provider Role From To    Kendall Gamino MD Attending Provider 17 0110 --      Reason for Visit     Ankle Pain           Diagnoses this Visit        Comments    Sprain of right ankle, unspecified ligament, initial encounter    -  Primary     Trauma           ED Disposition     None           To Do List           Follow-up Information     Follow up with [facility]. Schedule an appointment as soon as possible for a visit in 1 week.    Specialty:  Orthopedic Surgery    Contact information:    [address] [id number] [phone]          Follow up with [facility].    Specialty:  Emergency Medicine    Why:  If symptoms worsen    Contact information:    [address] [address] [phone]       These Medications        Disp Refills Start End    ibuprofen (ADVIL,MOTRIN) 800 MG tablet 20 tablet 0 2017     Take 1 tablet (800 mg total) by mouth 3 (three) times daily as needed for Pain. - Oral      [facility] On Call     [facility] On Call Nurse Care Line -  Assistance  Unless otherwise directed by your provider, please contact [facility], our nurse care line that is available for  assistance.     Registered nurses in the [facility] provide: appointment scheduling, clinical advisement, health education, and other advisory services.  Call: [phone] (toll free)               Medications           Message regarding Medications     Verify the changes and/or additions to your medication regime listed below are the same as discussed with your clinician today.  If any of these changes or additions are incorrect, please notify your  "healthcare provider.        START taking these NEW medications        Refills    ibuprofen (ADVIL,MOTRIN) 800 MG tablet 0    Sig: Take 1 tablet (800 mg total) by mouth 3 (three) times daily as needed for Pain.    Class: Print    Route: Oral      These medications were administered today        Dose Freq    ibuprofen tablet 800 mg 800 mg ED 1 Time    Sig: Take 2 tablets (800 mg total) by mouth ED 1 Time.    Class: Normal    Route: Oral           Verify that the below list of medications is an accurate representation of the medications you are currently taking.  If none reported, the list may be blank. If incorrect, please contact your healthcare provider. Carry this list with you in case of emergency.           Current Medications     busPIRone (BUSPAR) 15 MG tablet Take 15 mg by mouth 3 (three) times daily.    escitalopram oxalate (LEXAPRO) 20 MG tablet Take 20 mg by mouth once daily.    etonogestrel (IMPLANON) 68 mg Impl by Subdermal route.    ibuprofen (ADVIL,MOTRIN) 800 MG tablet Take 1 tablet (800 mg total) by mouth 3 (three) times daily as needed for Pain.    ibuprofen tablet 800 mg Take 2 tablets (800 mg total) by mouth ED 1 Time.           Clinical Reference Information           Your Vitals Were     BP Pulse Temp Resp Height Weight    105/61 98 97.9 °F (36.6 °C) (Oral) 20 6' 1" (1.854 m) 86.2 kg (190 lb)    SpO2 BMI             96% 25.07 kg/m2         Allergies as of 5/11/2017     No Known Allergies      Immunizations Administered on Date of Encounter - 5/11/2017     None      ED Micro, Lab, POCT     None      ED Imaging Orders     Start Ordered       Status Ordering Provider    05/11/17 0111 05/11/17 0110  X-Ray Ankle Complete Right  1 time imaging      Final result     05/11/17 0111 05/11/17 0110  X-Ray Foot Complete Right  1 time imaging      Final result     05/11/17 0111 05/11/17 0110  X-Ray Tibia Fibula 2 View Right  1 time imaging      Final result       Discharge References/Attachments     SPRAIN, " ANKLE (ADULT) (ENGLISH)    CRUTCHES (WEIGHT-BEARING), DISCHARGE INSTRUCTIONS (ENGLISH)      [facility] Sign-Up     Activating your [facility] account is as easy as 1-2-3!     1) Visit [email or web address], select Sign Up Now, enter this activation code and your date of birth, then select Next.  [email or web address]  Expires: 6/25/2017  2:06 AM      2) Create a username and password to use when you visit [facility] in the future and select a security question in case you lose your password and select Next.    3) Enter your e-mail address and click Sign Up!    Additional Information  If you have questions, please e-mail [email or web address] or call [phone] to talk to our [employer] staff. Remember, [facility] is NOT to be used for urgent needs. For medical emergencies, dial 911.          [facility] complies with applicable Federal civil rights laws and does not discriminate on the basis of race, color, national origin, age, disability, or sex.        Language Assistance Services     ATTENTION: Language assistance services are available, free of charge. Please call [phone].      ATENCIÓN: Si habla desirae, tiene a benito disposición servicios gratuitos de asistencia lingüística. Llame al [phone].     CHÚ Ý: N?u b?n nói Ti?ng Vi?t, có các d?ch v? h? tr? ngôn ng? mi?n phí dành cho b?n. G?i s? [phone].         5

## 2023-02-24 NOTE — ED PROVIDER NOTE - NSFOLLOWUPINSTRUCTIONS_ED_ALL_ED_FT
Sinus Pain    Outline of a head showing the sinuses.   Sinus pain may occur when your sinuses become clogged or swollen. Sinuses are air-filled spaces in your skull that are behind the bones of your face and forehead. Sinus pain can range from mild to severe.      What are the causes?    Sinus pain can result from various conditions that affect the sinuses. Common causes include:  •Colds.      •Sinus infections.      •Allergies.        What are the signs or symptoms?    The main symptom of this condition is pain or pressure in your face, forehead, ears, or upper teeth. People who have sinus pain often have other symptoms, such as:  •Congested or runny nose.      •Fever.      •Inability to smell.      •Headache.      Weather changes can make symptoms worse.      How is this diagnosed?    Your health care provider will diagnose this condition based on your symptoms and a physical exam. If you have pain that keeps coming back or does not go away, your health care provider may recommend more testing. This may include:  •Imaging tests, such as a CT scan or MRI, to check for problems with your sinuses.      •Examination of your sinuses using a thin tool with a camera that is inserted through your nose (endoscopy).        How is this treated?    Treatment for this condition depends on the cause.  •Sinus pain that is caused by a sinus infection may be treated with antibiotic medicine.      •Sinus pain that is caused by congestion may be helped by rinsing out (flushing) the nose and sinuses with saline solution.      •Sinus pain that is caused by allergies may be helped by allergy medicines (antihistamines) and medicated nasal sprays.      •Sinus surgery may be needed in some cases if other treatments do not help.        Follow these instructions at home:    General instructions   •If directed:  •Apply a warm, moist washcloth to your face to help relieve pain.      •Use a nasal saline wash. Follow the directions on the bottle or box.        Hydrate and humidify     •Drink enough water to keep your urine clear or pale yellow. Staying hydrated will help to thin your mucus.      •Use a humidifier if your home is dry.      •Inhale steam for 10–15 minutes, 3–4 times a day or as told by your health care provider. You can do this in the bathroom while a hot shower is running.      •Limit your exposure to cool or dry air.        Medicines   A person using nasal spray.   •Take over-the-counter and prescription medicines only as told by your health care provider.      •If you were prescribed an antibiotic medicine, take it as told by your health care provider. Do not stop taking the antibiotic even if you start to feel better.      •If you have congestion, use a nasal spray to help lessen pressure.        Contact a health care provider if:    •You have sinus pain more than one time a week.      •You have sensitivity to light or sound.      •You develop a fever.      •You feel nauseous or you vomit.      •Your sinus pain or headache does not get better with treatment.        Get help right away if:    •You have vision problems.      •You have sudden, severe pain in your face or head.      •You have a seizure.      •You are confused.      •You have a stiff neck.        Summary    •Sinus pain occurs when your sinuses become clogged or swollen.      •Sinus pain can result from various conditions that affect the sinuses, such as a cold, a sinus infection, or an allergy.      •Treatment for this condition depends on the cause. It may include medicine, such as antibiotics or antihistamines.      This information is not intended to replace advice given to you by your health care provider. Make sure you discuss any questions you have with your health care provider.

## 2023-02-24 NOTE — ED PROVIDER NOTE - ATTENDING APP SHARED VISIT CONTRIBUTION OF CARE
Pt with chronic sinus congestion that has been ongoing for years. Pt states that this evening he awoke from sleep with L-sided nose pain. no other complaints.  Pt with nasal pain/sinus congestion. pt given tylenol and discharged with outpatient f/up and return instructions.

## 2023-02-24 NOTE — ED ADULT TRIAGE NOTE - CHIEF COMPLAINT QUOTE
sinus congestion "for a very long time". denies any cough, fever and chills. pt has been taking prescribed meds but no relief.

## 2023-02-24 NOTE — ED PROVIDER NOTE - CLINICAL SUMMARY MEDICAL DECISION MAKING FREE TEXT BOX
89yo M p/w nasal congestion and L sided ethmoid sinus pain. no sinus tenderness, clear rhinorrhea noted in nares bilat. nl heart sound, lungs clear bilat. presentation consistent with seasonal allergies. pt f/u with ENT and rx flonase. referred to allergist. gave tylenol for pain. advised to continue using flonase as directed by ENT. discussed supportive care measures and return precautions. pt verbalized understanding and agreement.

## 2023-02-24 NOTE — ED PROVIDER NOTE - NS ED ROS FT
Gen: denies fever, chills, fatigue, weight loss  HEENT: + nasal congestion and L sided ethmoid sinus pain. denies visual changes, ear pain, throat pain  Respiratory: denies ZARAGOZA, SOB, cough, wheezing  Cardiovascular: denies chest pain, palpitations, diaphoresis, LE edema

## 2023-02-24 NOTE — ED PROVIDER NOTE - CARE PROVIDER_API CALL
Rahul Preciado)  [facility]  [address] [place] [address]  Phone: [phone]  Fax: [phone]  Follow Up Time:

## 2023-07-06 ENCOUNTER — EMERGENCY (EMERGENCY)
Facility: HOSPITAL | Age: 83
LOS: 1 days | Discharge: HOME | End: 2023-07-06
Attending: EMERGENCY MEDICINE
Payer: MEDICARE

## 2023-07-06 VITALS
WEIGHT: 151.24 LBS | TEMPERATURE: 98 F | HEART RATE: 74 BPM | OXYGEN SATURATION: 97 % | DIASTOLIC BLOOD PRESSURE: 74 MMHG | HEIGHT: 68 IN | SYSTOLIC BLOOD PRESSURE: 172 MMHG | RESPIRATION RATE: 20 BRPM

## 2023-07-06 DIAGNOSIS — Z95.2 PRESENCE OF PROSTHETIC HEART VALVE: Chronic | ICD-10-CM

## 2023-07-06 DIAGNOSIS — Z98.89 OTHER SPECIFIED POSTPROCEDURAL STATES: Chronic | ICD-10-CM

## 2023-07-06 LAB
ALBUMIN SERPL ELPH-MCNC: 4.2 G/DL — SIGNIFICANT CHANGE UP (ref 3.3–5.2)
ALP SERPL-CCNC: 86 U/L — SIGNIFICANT CHANGE UP (ref 40–120)
ALT FLD-CCNC: 18 U/L — SIGNIFICANT CHANGE UP
ANION GAP SERPL CALC-SCNC: 12 MMOL/L — SIGNIFICANT CHANGE UP (ref 5–17)
APPEARANCE UR: CLEAR — SIGNIFICANT CHANGE UP
AST SERPL-CCNC: 36 U/L — SIGNIFICANT CHANGE UP
BASOPHILS # BLD AUTO: 0.03 K/UL — SIGNIFICANT CHANGE UP (ref 0–0.2)
BASOPHILS NFR BLD AUTO: 0.5 % — SIGNIFICANT CHANGE UP (ref 0–2)
BILIRUB SERPL-MCNC: 0.2 MG/DL — LOW (ref 0.4–2)
BILIRUB UR-MCNC: NEGATIVE — SIGNIFICANT CHANGE UP
BUN SERPL-MCNC: 25.3 MG/DL — HIGH (ref 8–20)
CALCIUM SERPL-MCNC: 9.2 MG/DL — SIGNIFICANT CHANGE UP (ref 8.4–10.5)
CHLORIDE SERPL-SCNC: 103 MMOL/L — SIGNIFICANT CHANGE UP (ref 96–108)
CO2 SERPL-SCNC: 25 MMOL/L — SIGNIFICANT CHANGE UP (ref 22–29)
COLOR SPEC: YELLOW — SIGNIFICANT CHANGE UP
CREAT SERPL-MCNC: 1.07 MG/DL — SIGNIFICANT CHANGE UP (ref 0.5–1.3)
DIFF PNL FLD: NEGATIVE — SIGNIFICANT CHANGE UP
EGFR: 69 ML/MIN/1.73M2 — SIGNIFICANT CHANGE UP
EOSINOPHIL # BLD AUTO: 0.26 K/UL — SIGNIFICANT CHANGE UP (ref 0–0.5)
EOSINOPHIL NFR BLD AUTO: 4.2 % — SIGNIFICANT CHANGE UP (ref 0–6)
GLUCOSE SERPL-MCNC: 144 MG/DL — HIGH (ref 70–99)
GLUCOSE UR QL: NEGATIVE MG/DL — SIGNIFICANT CHANGE UP
HCT VFR BLD CALC: 42.7 % — SIGNIFICANT CHANGE UP (ref 39–50)
HGB BLD-MCNC: 14.1 G/DL — SIGNIFICANT CHANGE UP (ref 13–17)
IMM GRANULOCYTES NFR BLD AUTO: 0.2 % — SIGNIFICANT CHANGE UP (ref 0–0.9)
KETONES UR-MCNC: NEGATIVE — SIGNIFICANT CHANGE UP
LEUKOCYTE ESTERASE UR-ACNC: NEGATIVE — SIGNIFICANT CHANGE UP
LYMPHOCYTES # BLD AUTO: 2.34 K/UL — SIGNIFICANT CHANGE UP (ref 1–3.3)
LYMPHOCYTES # BLD AUTO: 37.6 % — SIGNIFICANT CHANGE UP (ref 13–44)
MCHC RBC-ENTMCNC: 29.7 PG — SIGNIFICANT CHANGE UP (ref 27–34)
MCHC RBC-ENTMCNC: 33 GM/DL — SIGNIFICANT CHANGE UP (ref 32–36)
MCV RBC AUTO: 90.1 FL — SIGNIFICANT CHANGE UP (ref 80–100)
MONOCYTES # BLD AUTO: 0.56 K/UL — SIGNIFICANT CHANGE UP (ref 0–0.9)
MONOCYTES NFR BLD AUTO: 9 % — SIGNIFICANT CHANGE UP (ref 2–14)
NEUTROPHILS # BLD AUTO: 3.03 K/UL — SIGNIFICANT CHANGE UP (ref 1.8–7.4)
NEUTROPHILS NFR BLD AUTO: 48.5 % — SIGNIFICANT CHANGE UP (ref 43–77)
NITRITE UR-MCNC: NEGATIVE — SIGNIFICANT CHANGE UP
PH UR: 6 — SIGNIFICANT CHANGE UP (ref 5–8)
PLATELET # BLD AUTO: 505 K/UL — HIGH (ref 150–400)
POTASSIUM SERPL-MCNC: 5 MMOL/L — SIGNIFICANT CHANGE UP (ref 3.5–5.3)
POTASSIUM SERPL-SCNC: 5 MMOL/L — SIGNIFICANT CHANGE UP (ref 3.5–5.3)
PROT SERPL-MCNC: 6.9 G/DL — SIGNIFICANT CHANGE UP (ref 6.6–8.7)
PROT UR-MCNC: NEGATIVE — SIGNIFICANT CHANGE UP
RBC # BLD: 4.74 M/UL — SIGNIFICANT CHANGE UP (ref 4.2–5.8)
RBC # FLD: 15.4 % — HIGH (ref 10.3–14.5)
SODIUM SERPL-SCNC: 140 MMOL/L — SIGNIFICANT CHANGE UP (ref 135–145)
SP GR SPEC: 1.01 — SIGNIFICANT CHANGE UP (ref 1.01–1.02)
UROBILINOGEN FLD QL: NEGATIVE MG/DL — SIGNIFICANT CHANGE UP
WBC # BLD: 6.23 K/UL — SIGNIFICANT CHANGE UP (ref 3.8–10.5)
WBC # FLD AUTO: 6.23 K/UL — SIGNIFICANT CHANGE UP (ref 3.8–10.5)

## 2023-07-06 PROCEDURE — 36415 COLL VENOUS BLD VENIPUNCTURE: CPT

## 2023-07-06 PROCEDURE — 85025 COMPLETE CBC W/AUTO DIFF WBC: CPT

## 2023-07-06 PROCEDURE — 99283 EMERGENCY DEPT VISIT LOW MDM: CPT

## 2023-07-06 PROCEDURE — 87086 URINE CULTURE/COLONY COUNT: CPT

## 2023-07-06 PROCEDURE — 80053 COMPREHEN METABOLIC PANEL: CPT

## 2023-07-06 PROCEDURE — 99284 EMERGENCY DEPT VISIT MOD MDM: CPT | Mod: FS

## 2023-07-06 PROCEDURE — 81003 URINALYSIS AUTO W/O SCOPE: CPT

## 2023-07-06 RX ORDER — ACETAMINOPHEN 500 MG
1000 TABLET ORAL ONCE
Refills: 0 | Status: COMPLETED | OUTPATIENT
Start: 2023-07-06 | End: 2023-07-06

## 2023-07-06 RX ORDER — HYDROCORTISONE 0.5 %
1 CREAM (GRAM) TOPICAL
Qty: 1 | Refills: 0
Start: 2023-07-06 | End: 2023-07-11

## 2023-07-06 RX ORDER — ACETAMINOPHEN 500 MG
2 TABLET ORAL
Qty: 30 | Refills: 0
Start: 2023-07-06 | End: 2023-07-10

## 2023-07-06 NOTE — ED PROVIDER NOTE - OBJECTIVE STATEMENT
81 yo male hx of htn hld niddm BPH " heart issues", constipation recent usage of bowel medication to assist with stooling,  presenting to the ED with lower back pain constant non radiating reporting pain over his kidneys. no associated fever chills dysuria hematuria testicular pain abd pain chest pain or sob. no medication given or taken for symptomatic relief. denies bladder or bowel incontinence or numbness or tingling to lower extremities. denies trauma

## 2023-07-06 NOTE — ED ADULT NURSE NOTE - OBJECTIVE STATEMENT
assumed care of pt from triage, pt AAOX3, resp. even and unlabored on RA, pt c/o bilat flank pain starting monday, denies fever/chills, denies hematuria/dysuria/polyuria, denies abd. pain/N/V/D, pt denies taking anything at home for pain

## 2023-07-06 NOTE — ED PROVIDER NOTE - PHYSICAL EXAMINATION
Gen: Well appearing in NAD  Head: NC/AT  Neck: trachea midline  Resp:  No distress  spine no midline pt vertebral or step offs   abd soft nd nttp + cvat bilaterally. no overlying rash  Ext: no deformities  Neuro:  A&O appears non focal  Skin:  Warm and dry as visualized  Psych:  Normal affect and mood

## 2023-07-06 NOTE — ED PROVIDER NOTE - PROGRESS NOTE DETAILS
BLAINE GONG: advised on results including incidentals. advised on fu with pcp and return precautions. will prescribe tylenol since allergy to ibu and salonpas patches

## 2023-07-06 NOTE — ED PROVIDER NOTE - PATIENT PORTAL LINK FT
You can access the FollowMyHealth Patient Portal offered by [facility] by registering at the following website: [email or web address]. By joining [employer]’s FollowMyHealth portal, you will also be able to view your health information using other applications (apps) compatible with our system.

## 2023-07-06 NOTE — ED PROVIDER NOTE - CLINICAL SUMMARY MEDICAL DECISION MAKING FREE TEXT BOX
83 yo male hx of htn hld BPh reporting "kidney pain" since sunday, denies dysuria or hematuria + Cvat tenderness on exam. will check labs and urine tylenol for pain due to allergy to ibu. no bony tenderness no reporting falls.   pending labs and re-evaluation 83 yo male hx of htn hld BPh reporting "kidney pain" since sunday, denies dysuria or hematuria + Cvat tenderness on exam. will check labs and urine tylenol for pain due to allergy to ibu. no bony tenderness no reporting falls.   pending labs and re-evaluation    stable kidney function urine non infectious and without blood. given tylenol due to allergy to ibu advised on fu and return precautions. report of blood work and urinalysis given for fu

## 2023-07-06 NOTE — ED ADULT NURSE NOTE - NSFALLUNIVINTERV_ED_ALL_ED
Bed/Stretcher in lowest position, wheels locked, appropriate side rails in place/Call bell, personal items and telephone in reach/Instruct patient to call for assistance before getting out of bed/chair/stretcher/Non-slip footwear applied when patient is off stretcher/[place] to call system/Physically safe environment - no spills, clutter or unnecessary equipment/Purposeful proactive rounding/Room/bathroom lighting operational, light cord in reach

## 2023-07-07 LAB
CULTURE RESULTS: NO GROWTH — SIGNIFICANT CHANGE UP
SPECIMEN SOURCE: SIGNIFICANT CHANGE UP

## 2023-10-03 NOTE — ED ADULT TRIAGE NOTE - NSWEIGHTCALCTOOLDRUG_GEN_A_CORE
Complex Care Management Program Enrollment Note    Power of  was notified of care management eligibility due to patient's health history and current identified needs, along with the ability to opt out of the care management program at any time.     Clinical History:    Patient's electronic medical record was reviewed (including current problems list) to obtain clinical history (past hospitalization and major procedures, significant past illnesses and treatment history), progression of illness, including medications, dosage and frequency.    Life Planning Activities:    Patient assessed for life planning activities and patient has Advanced Directive document completed.    Care Plan:    Care Plan shared with Power of     Care Plan shared with Dr. Shadi Jaime on 10/3/23 via In-Basket message.    Follow up Plan:    Complex Care Manager will contact POA next during the week of 10/23/23.    Topics to be discussed on next call: Managing Dementia, PCP f/u regarding aspirin?, SW f/u, [employer]/[facility] f/u, resources reviewed: Same-Day Access , [facility] Know Where to Go flyer, educational materials from Epic on Dementia?, Care Gaps.     used

## 2024-07-17 ENCOUNTER — EMERGENCY (EMERGENCY)
Facility: HOSPITAL | Age: 84
LOS: 1 days | Discharge: HOME | End: 2024-07-17
Attending: EMERGENCY MEDICINE
Payer: MEDICARE

## 2024-07-17 VITALS
SYSTOLIC BLOOD PRESSURE: 162 MMHG | DIASTOLIC BLOOD PRESSURE: 74 MMHG | TEMPERATURE: 99 F | RESPIRATION RATE: 20 BRPM | WEIGHT: 135.58 LBS | OXYGEN SATURATION: 98 % | HEART RATE: 70 BPM

## 2024-07-17 DIAGNOSIS — Z95.2 PRESENCE OF PROSTHETIC HEART VALVE: Chronic | ICD-10-CM

## 2024-07-17 DIAGNOSIS — Z98.89 UNDEFINED: Chronic | ICD-10-CM

## 2024-07-17 LAB
ANION GAP SERPL CALC-SCNC: 10 MMOL/L — SIGNIFICANT CHANGE UP (ref 5–17)
BUN SERPL-MCNC: 13.8 MG/DL — SIGNIFICANT CHANGE UP (ref 8–20)
CALCIUM SERPL-MCNC: 9.6 MG/DL — SIGNIFICANT CHANGE UP (ref 8.4–10.5)
CHLORIDE SERPL-SCNC: 98 MMOL/L — SIGNIFICANT CHANGE UP (ref 96–108)
CO2 SERPL-SCNC: 31 MMOL/L — HIGH (ref 22–29)
CREAT SERPL-MCNC: 1.23 MG/DL — SIGNIFICANT CHANGE UP (ref 0.5–1.3)
EGFR: 58 ML/MIN/1.73M2 — LOW
GLUCOSE SERPL-MCNC: 110 MG/DL — HIGH (ref 70–99)
POTASSIUM SERPL-MCNC: 4.5 MMOL/L — SIGNIFICANT CHANGE UP (ref 3.5–5.3)
POTASSIUM SERPL-SCNC: 4.5 MMOL/L — SIGNIFICANT CHANGE UP (ref 3.5–5.3)
SODIUM SERPL-SCNC: 139 MMOL/L — SIGNIFICANT CHANGE UP (ref 135–145)

## 2024-07-17 PROCEDURE — 99283 EMERGENCY DEPT VISIT LOW MDM: CPT

## 2024-07-17 PROCEDURE — 99284 EMERGENCY DEPT VISIT MOD MDM: CPT

## 2024-07-17 PROCEDURE — 36415 COLL VENOUS BLD VENIPUNCTURE: CPT

## 2024-07-17 PROCEDURE — 80048 BASIC METABOLIC PNL TOTAL CA: CPT

## 2024-07-17 RX ORDER — LIDOCAINE HCL 28 MG/G
1 GEL TOPICAL ONCE
Refills: 0 | Status: COMPLETED | OUTPATIENT
Start: 2024-07-17 | End: 2024-07-17

## 2024-07-17 RX ORDER — ACETAMINOPHEN 325 MG
975 TABLET ORAL ONCE
Refills: 0 | Status: COMPLETED | OUTPATIENT
Start: 2024-07-17 | End: 2024-07-17

## 2024-07-17 RX ORDER — LIDOCAINE HCL 28 MG/G
1 GEL TOPICAL
Qty: 5 | Refills: 0
Start: 2024-07-17 | End: 2024-07-21

## 2024-07-17 RX ADMIN — Medication 975 MILLIGRAM(S): at 05:15

## 2024-07-17 RX ADMIN — LIDOCAINE HCL 1 PATCH: 28 GEL TOPICAL at 05:16

## 2024-07-17 NOTE — ED PROVIDER NOTE - CLINICAL SUMMARY MEDICAL DECISION MAKING FREE TEXT BOX
With likely musculoskeletal back pain, parathoracic tenderness and tightness on exam.  Pain worse with movement.  Patient concerned about his kidneys, BMP checked with normal creatinine.  Pain improving with Lidoderm and Tylenol.  Patient ready for discharge with outpatient follow-up.  Return precautions given.

## 2024-07-17 NOTE — ED PROVIDER NOTE - ATTENDING CONTRIBUTION TO CARE
Krystina: I performed a face to face bedside interview with patient regarding history of present illness, review of symptoms and past medical history. I completed an independent physical exam and ordered tests/medications as needed.  I have discussed patient's plan of care with the resident. The resident assisted in  executing the discussed plan. I was available for any questions or issues that may have arose during the execution of the plan of care.

## 2024-07-17 NOTE — ED PROVIDER NOTE - PATIENT PORTAL LINK FT
You can access the FollowMyHealth Patient Portal offered by [facility] by registering at the following website: [email or web address]. By joining [employer]’s FollowMyHealth portal, you will also be able to view your health information using other applications (apps) compatible with our system.

## 2024-07-17 NOTE — ED PROVIDER NOTE - NSFOLLOWUPINSTRUCTIONS_ED_ALL_ED_FT
- Follow up with your doctor within 2-3 days.   - Bring results with you to the appointment.   - Take Tylenol (Acetaminophen) 650mg every 6 hours as needed for pain.   - Apply Lidocaine patch for up to 12 hours per day. If the patch is expensive or not covered by insurance, you can purchase Salonpas patch over the counter.   - Return to the ED for any new or worsening symptoms.     Back Pain    Back pain is very common in adults. The cause of back pain is rarely dangerous and the pain often gets better over time. The cause of your back pain may not be known and may include strain of muscles or ligaments, degeneration of the spinal disks, or arthritis. Occasionally the pain may radiate down your leg(s). Over-the-counter medicines to reduce pain and inflammation are often the most helpful. Stretching and remaining active frequently helps the healing process.     SEEK IMMEDIATE MEDICAL CARE IF YOU HAVE ANY OF THE FOLLOWING SYMPTOMS: bowel or bladder control problems, unusual weakness or numbness in your arms or legs, nausea or vomiting, abdominal pain, fever, dizziness/lightheadedness.

## 2024-07-17 NOTE — ED ADULT NURSE NOTE - NSFALLUNIVINTERV_ED_ALL_ED
Bed/Stretcher in lowest position, wheels locked, appropriate side rails in place/Call bell, personal items and telephone in reach/Instruct patient to call for assistance before getting out of bed/chair/stretcher/Non-slip footwear applied when patient is off stretcher/[place] to call system/Physically safe environment - no spills, clutter or unnecessary equipment/Purposeful proactive rounding/Room/bathroom lighting operational, light cord in reach

## 2024-07-17 NOTE — ED PROVIDER NOTE - OBJECTIVE STATEMENT
82 yo M with HTN, DM,  s/p AVR 4 years ago, s/p prostectomy presents with atraumatic right flank pain started this evening, worse with twisting or moving.  Was concerned that it may be his kidneys.  Pain does not radiate.  Denies dysuria, hematuria, urinary frequency, fever, chest pain, shortness of breath.

## 2024-07-17 NOTE — ED ADULT NURSE NOTE - OBJECTIVE STATEMENT
Assumed care of pt at 0520. Pt is A&Ox4. Respirations are even and unlabored. Pt present to the ED for middle back pain since yesterday. denies trauma to area. Pt denies chest pain, sob and dysuria. ED provider evaluating, plan of care ongoing.

## 2024-07-17 NOTE — ED PROVIDER NOTE - PHYSICAL EXAMINATION
Gen: Well appearing in NAD  Head: NC/AT  Neck: trachea midline  Resp:  No distress, CTAB  CV: RRR  GI: soft, NTND  Ext: no deformities,  no midline spinal tenderness to palpation, no CVA tenderness, mild right parathoracic tenderness to palpation  Neuro:  A&O appears non focal  Skin:  Warm and dry as visualized  Psych:  Normal affect and mood

## 2024-07-22 DIAGNOSIS — M54.6 PAIN IN THORACIC SPINE: ICD-10-CM

## 2024-07-22 DIAGNOSIS — Z95.2 PRESENCE OF PROSTHETIC HEART VALVE: ICD-10-CM

## 2024-07-22 DIAGNOSIS — R10.9 UNSPECIFIED ABDOMINAL PAIN: ICD-10-CM

## 2024-07-22 DIAGNOSIS — Z90.79 ACQUIRED ABSENCE OF OTHER GENITAL ORGAN(S): ICD-10-CM

## 2024-07-22 DIAGNOSIS — Z87.891 PERSONAL HISTORY OF NICOTINE DEPENDENCE: ICD-10-CM

## 2024-07-22 DIAGNOSIS — E11.9 TYPE 2 DIABETES MELLITUS WITHOUT COMPLICATIONS: ICD-10-CM

## 2024-07-22 DIAGNOSIS — I10 ESSENTIAL (PRIMARY) HYPERTENSION: ICD-10-CM

## 2024-09-29 ENCOUNTER — EMERGENCY (EMERGENCY)
Facility: HOSPITAL | Age: 84
LOS: 1 days | Discharge: HOME | End: 2024-09-29
Attending: EMERGENCY MEDICINE
Payer: MEDICARE

## 2024-09-29 VITALS
SYSTOLIC BLOOD PRESSURE: 150 MMHG | DIASTOLIC BLOOD PRESSURE: 73 MMHG | HEART RATE: 83 BPM | TEMPERATURE: 98 F | HEIGHT: 68 IN | WEIGHT: 154.76 LBS | RESPIRATION RATE: 20 BRPM | OXYGEN SATURATION: 96 %

## 2024-09-29 DIAGNOSIS — Z95.2 PRESENCE OF PROSTHETIC HEART VALVE: Chronic | ICD-10-CM

## 2024-09-29 DIAGNOSIS — Z98.89 OTHER SPECIFIED POSTPROCEDURAL STATES: Chronic | ICD-10-CM

## 2024-09-29 LAB
ALBUMIN SERPL ELPH-MCNC: 4.7 G/DL — SIGNIFICANT CHANGE UP (ref 3.3–5.2)
ALP SERPL-CCNC: 96 U/L — SIGNIFICANT CHANGE UP (ref 40–120)
ALT FLD-CCNC: 18 U/L — SIGNIFICANT CHANGE UP
ANION GAP SERPL CALC-SCNC: 13 MMOL/L — SIGNIFICANT CHANGE UP (ref 5–17)
APTT BLD: 37.4 SEC — HIGH (ref 24.5–35.6)
AST SERPL-CCNC: 39 U/L — SIGNIFICANT CHANGE UP
BASOPHILS # BLD AUTO: 0.03 K/UL — SIGNIFICANT CHANGE UP (ref 0–0.2)
BASOPHILS NFR BLD AUTO: 0.4 % — SIGNIFICANT CHANGE UP (ref 0–2)
BILIRUB SERPL-MCNC: 0.3 MG/DL — LOW (ref 0.4–2)
BUN SERPL-MCNC: 25.2 MG/DL — HIGH (ref 8–20)
CALCIUM SERPL-MCNC: 9.6 MG/DL — SIGNIFICANT CHANGE UP (ref 8.4–10.5)
CHLORIDE SERPL-SCNC: 103 MMOL/L — SIGNIFICANT CHANGE UP (ref 96–108)
CO2 SERPL-SCNC: 26 MMOL/L — SIGNIFICANT CHANGE UP (ref 22–29)
CREAT SERPL-MCNC: 1.24 MG/DL — SIGNIFICANT CHANGE UP (ref 0.5–1.3)
EGFR: 57 ML/MIN/1.73M2 — LOW
EOSINOPHIL # BLD AUTO: 0.21 K/UL — SIGNIFICANT CHANGE UP (ref 0–0.5)
EOSINOPHIL NFR BLD AUTO: 2.8 % — SIGNIFICANT CHANGE UP (ref 0–6)
GLUCOSE SERPL-MCNC: 92 MG/DL — SIGNIFICANT CHANGE UP (ref 70–99)
HCT VFR BLD CALC: 47 % — SIGNIFICANT CHANGE UP (ref 39–50)
HGB BLD-MCNC: 15.5 G/DL — SIGNIFICANT CHANGE UP (ref 13–17)
IMM GRANULOCYTES NFR BLD AUTO: 0.3 % — SIGNIFICANT CHANGE UP (ref 0–0.9)
INR BLD: 0.97 RATIO — SIGNIFICANT CHANGE UP (ref 0.85–1.16)
LACTATE BLDV-MCNC: 1.1 MMOL/L — SIGNIFICANT CHANGE UP (ref 0.5–2)
LYMPHOCYTES # BLD AUTO: 1.93 K/UL — SIGNIFICANT CHANGE UP (ref 1–3.3)
LYMPHOCYTES # BLD AUTO: 25.6 % — SIGNIFICANT CHANGE UP (ref 13–44)
MCHC RBC-ENTMCNC: 29.9 PG — SIGNIFICANT CHANGE UP (ref 27–34)
MCHC RBC-ENTMCNC: 33 GM/DL — SIGNIFICANT CHANGE UP (ref 32–36)
MCV RBC AUTO: 90.6 FL — SIGNIFICANT CHANGE UP (ref 80–100)
MONOCYTES # BLD AUTO: 0.51 K/UL — SIGNIFICANT CHANGE UP (ref 0–0.9)
MONOCYTES NFR BLD AUTO: 6.8 % — SIGNIFICANT CHANGE UP (ref 2–14)
NEUTROPHILS # BLD AUTO: 4.84 K/UL — SIGNIFICANT CHANGE UP (ref 1.8–7.4)
NEUTROPHILS NFR BLD AUTO: 64.1 % — SIGNIFICANT CHANGE UP (ref 43–77)
PLATELET # BLD AUTO: 596 K/UL — HIGH (ref 150–400)
POTASSIUM SERPL-MCNC: 4.4 MMOL/L — SIGNIFICANT CHANGE UP (ref 3.5–5.3)
POTASSIUM SERPL-SCNC: 4.4 MMOL/L — SIGNIFICANT CHANGE UP (ref 3.5–5.3)
PROT SERPL-MCNC: 7.8 G/DL — SIGNIFICANT CHANGE UP (ref 6.6–8.7)
PROTHROM AB SERPL-ACNC: 11.3 SEC — SIGNIFICANT CHANGE UP (ref 9.9–13.4)
RBC # BLD: 5.19 M/UL — SIGNIFICANT CHANGE UP (ref 4.2–5.8)
RBC # FLD: 15.2 % — HIGH (ref 10.3–14.5)
SODIUM SERPL-SCNC: 142 MMOL/L — SIGNIFICANT CHANGE UP (ref 135–145)
WBC # BLD: 7.54 K/UL — SIGNIFICANT CHANGE UP (ref 3.8–10.5)
WBC # FLD AUTO: 7.54 K/UL — SIGNIFICANT CHANGE UP (ref 3.8–10.5)

## 2024-09-29 PROCEDURE — 99284 EMERGENCY DEPT VISIT MOD MDM: CPT

## 2024-09-29 RX ORDER — DIATRIZOATE MEGLUMINE 60 %
30 VIAL (ML) INJECTION ONCE
Refills: 0 | Status: COMPLETED | OUTPATIENT
Start: 2024-09-29 | End: 2024-09-29

## 2024-09-29 RX ORDER — SODIUM CHLORIDE 0.9 % (FLUSH) 0.9 %
3 SYRINGE (ML) INJECTION ONCE
Refills: 0 | Status: COMPLETED | OUTPATIENT
Start: 2024-09-29 | End: 2024-09-29

## 2024-09-29 RX ADMIN — Medication 30 MILLILITER(S): at 22:13

## 2024-09-29 RX ADMIN — Medication 3 MILLILITER(S): at 22:05

## 2024-09-29 NOTE — ED PROVIDER NOTE - PATIENT PORTAL LINK FT
You can access the FollowMyHealth Patient Portal offered by [facility] by registering at the following website: [email or web address]. By joining [email or web address]’s FollowMyHealth portal, you will also be able to view your health information using other applications (apps) compatible with our system.

## 2024-09-29 NOTE — ED STATDOCS - PROGRESS NOTE DETAILS
83 yo M hx of HTN, DM,   AVR , hernia repair, prostectomy, and chronic constipation p/w constipation, abdominal pain, and no BM x 3 days. Patient tried enema today with no relief. On exam, patient has diffuse lower abdominal pain. Will move patient to main ED for further evaluation by another provider.

## 2024-09-29 NOTE — ED ADULT NURSE NOTE - NSFALLHARMRISKINTERV_ED_ALL_ED

## 2024-09-29 NOTE — ED PROVIDER NOTE - NSFOLLOWUPINSTRUCTIONS_ED_ALL_ED_FT
- Follow up with your doctor within 2-3 days.   - Follow up with your Gastroenterologist.   - Bring results with you to the appointment.   - Drink the bottle of magnesium citrate.   - Return to the Emergency Department for any new or worsening symptoms.     Constipation    Constipation is when a person has fewer than three bowel movements a week, has difficulty having a bowel movement, or has stools that are dry, hard, or larger than normal. Other symptoms can include abdominal pain or bloating. As people grow older, constipation is more common. A low-fiber diet, not taking in enough fluids, and taking certain medicines, including opioid painkillers, may make constipation worse. Treatment varies but may include dietary modifications (more fiber-rich foods), lifestyle modifications, and possible medications.     SEEK IMMEDIATE MEDICAL CARE IF YOU HAVE ANY OF THE FOLLOWING SYMPTOMS: bright red blood in your stool, constipation for longer than 4 days, abdominal or rectal pain, unexplained weight loss, or inability to pass gas.

## 2024-09-29 NOTE — ED ADULT NURSE NOTE - OBJECTIVE STATEMENT
assumed pt care at this time pt c/o constipation x1 days, states normally takes and enema every day but today was not effective. Denies abd pain +BS x4 quads NAD noted RR even unlabored

## 2024-09-29 NOTE — ED ADULT TRIAGE NOTE - MEANS OF ARRIVAL
Gen: Well appearing, NAD  Head: NCAT  HEENT: PERRL, MMM, normal conjunctiva, anicteric, neck supple  Lung: b/l expiratory wheezing  CV: RRR, no murmurs  Abd: firm, nontender, distended no rebound or guarding, no CVAT  MSK: 3+ pitting edema, no visible deformities  Neuro: Moving all extremities grossly  Skin: Warm and dry, no evidence of rash  Psych: normal mood and affect
ambulatory

## 2024-09-29 NOTE — ED ADULT NURSE NOTE - GASTROINTESTINAL ASSESSMENT
- - - [Right] : right shoulder [There are no fractures, subluxations or dislocations. No significant abnormalities are seen] : There are no fractures, subluxations or dislocations. No significant abnormalities are seen [] : negative shoulder apprehension

## 2024-09-29 NOTE — ED PROVIDER NOTE - PROGRESS NOTE DETAILS
Krystina GUZMAN:  received patient in signout, reviewed results with patient.  Patient is following up with a GI doctor regarding his chronic constipation.  Shared decision making employed with patient, offered to perform bedside disimpaction versus giving a bottle of magnesium citrate for him to take home and drink.  Risks and benefits discussed, patient prefers to try the magnesium citrate.  Will follow-up with his GI doctor.  Return precautions given for new or worsening symptoms.

## 2024-09-29 NOTE — ED PROVIDER NOTE - OBJECTIVE STATEMENT
84-year-old male with history pretension, hyperlipidemia and diabetes with history of chronic constipation chronically  this is laxatives as well as enemas for relief of his constipation.  Patient states he last gave himself an enema 3 days ago with a  and today had the urge to go but was unable to move his bowels.  Patient complaining of lower abdominal pain with no associated vomiting or diarrhea, no reported fever or chills.  No associated urinary symptoms.  Patient denies any associate chest pain, shortness of breath or syncope.   patient is a poor historian

## 2024-09-30 VITALS
DIASTOLIC BLOOD PRESSURE: 82 MMHG | OXYGEN SATURATION: 95 % | RESPIRATION RATE: 19 BRPM | SYSTOLIC BLOOD PRESSURE: 163 MMHG | TEMPERATURE: 98 F | HEART RATE: 68 BPM

## 2024-09-30 PROCEDURE — 85025 COMPLETE CBC W/AUTO DIFF WBC: CPT

## 2024-09-30 PROCEDURE — 85610 PROTHROMBIN TIME: CPT

## 2024-09-30 PROCEDURE — 85730 THROMBOPLASTIN TIME PARTIAL: CPT

## 2024-09-30 PROCEDURE — 74176 CT ABD & PELVIS W/O CONTRAST: CPT | Mod: 26,MC

## 2024-09-30 PROCEDURE — 99284 EMERGENCY DEPT VISIT MOD MDM: CPT

## 2024-09-30 PROCEDURE — 80053 COMPREHEN METABOLIC PANEL: CPT

## 2024-09-30 PROCEDURE — 74176 CT ABD & PELVIS W/O CONTRAST: CPT | Mod: MC

## 2024-09-30 PROCEDURE — 83605 ASSAY OF LACTIC ACID: CPT

## 2024-09-30 PROCEDURE — 36415 COLL VENOUS BLD VENIPUNCTURE: CPT

## 2024-09-30 RX ADMIN — Medication 296 MILLILITER(S): at 01:58

## 2024-12-31 ENCOUNTER — EMERGENCY (EMERGENCY)
Facility: HOSPITAL | Age: 84
LOS: 1 days | Discharge: HOME | End: 2024-12-31
Attending: STUDENT IN AN ORGANIZED HEALTH CARE EDUCATION/TRAINING PROGRAM
Payer: MEDICARE

## 2024-12-31 VITALS
WEIGHT: 160.06 LBS | SYSTOLIC BLOOD PRESSURE: 183 MMHG | OXYGEN SATURATION: 94 % | DIASTOLIC BLOOD PRESSURE: 78 MMHG | TEMPERATURE: 98 F | HEART RATE: 90 BPM | RESPIRATION RATE: 18 BRPM

## 2024-12-31 VITALS
TEMPERATURE: 98 F | DIASTOLIC BLOOD PRESSURE: 81 MMHG | RESPIRATION RATE: 19 BRPM | SYSTOLIC BLOOD PRESSURE: 154 MMHG | HEART RATE: 80 BPM | OXYGEN SATURATION: 94 %

## 2024-12-31 DIAGNOSIS — Z98.89 OTHER SPECIFIED POSTPROCEDURAL STATES: Chronic | ICD-10-CM

## 2024-12-31 DIAGNOSIS — Z95.2 PRESENCE OF PROSTHETIC HEART VALVE: Chronic | ICD-10-CM

## 2024-12-31 LAB
ALBUMIN SERPL ELPH-MCNC: 4 G/DL — SIGNIFICANT CHANGE UP (ref 3.3–5.2)
ALP SERPL-CCNC: 90 U/L — SIGNIFICANT CHANGE UP (ref 40–120)
ALT FLD-CCNC: 12 U/L — SIGNIFICANT CHANGE UP
ANION GAP SERPL CALC-SCNC: 14 MMOL/L — SIGNIFICANT CHANGE UP (ref 5–17)
APAP SERPL-MCNC: <3 UG/ML — LOW (ref 10–26)
APPEARANCE UR: CLEAR — SIGNIFICANT CHANGE UP
AST SERPL-CCNC: 21 U/L — SIGNIFICANT CHANGE UP
BASOPHILS # BLD AUTO: 0.03 K/UL — SIGNIFICANT CHANGE UP (ref 0–0.2)
BASOPHILS NFR BLD AUTO: 0.3 % — SIGNIFICANT CHANGE UP (ref 0–2)
BILIRUB SERPL-MCNC: 0.3 MG/DL — LOW (ref 0.4–2)
BILIRUB UR-MCNC: NEGATIVE — SIGNIFICANT CHANGE UP
BUN SERPL-MCNC: 25.6 MG/DL — HIGH (ref 8–20)
CALCIUM SERPL-MCNC: 9.3 MG/DL — SIGNIFICANT CHANGE UP (ref 8.4–10.5)
CHLORIDE SERPL-SCNC: 99 MMOL/L — SIGNIFICANT CHANGE UP (ref 96–108)
CO2 SERPL-SCNC: 27 MMOL/L — SIGNIFICANT CHANGE UP (ref 22–29)
COLOR SPEC: YELLOW — SIGNIFICANT CHANGE UP
CREAT SERPL-MCNC: 1.4 MG/DL — HIGH (ref 0.5–1.3)
DIFF PNL FLD: NEGATIVE — SIGNIFICANT CHANGE UP
EGFR: 49 ML/MIN/1.73M2 — LOW
EOSINOPHIL # BLD AUTO: 0.05 K/UL — SIGNIFICANT CHANGE UP (ref 0–0.5)
EOSINOPHIL NFR BLD AUTO: 0.5 % — SIGNIFICANT CHANGE UP (ref 0–6)
ETHANOL SERPL-MCNC: <10 MG/DL — SIGNIFICANT CHANGE UP (ref 0–9)
GLUCOSE SERPL-MCNC: 167 MG/DL — HIGH (ref 70–99)
GLUCOSE UR QL: >=1000 MG/DL
HCT VFR BLD CALC: 46.1 % — SIGNIFICANT CHANGE UP (ref 39–50)
HGB BLD-MCNC: 14.9 G/DL — SIGNIFICANT CHANGE UP (ref 13–17)
IMM GRANULOCYTES NFR BLD AUTO: 0.4 % — SIGNIFICANT CHANGE UP (ref 0–0.9)
KETONES UR-MCNC: NEGATIVE MG/DL — SIGNIFICANT CHANGE UP
LEUKOCYTE ESTERASE UR-ACNC: NEGATIVE — SIGNIFICANT CHANGE UP
LYMPHOCYTES # BLD AUTO: 1.79 K/UL — SIGNIFICANT CHANGE UP (ref 1–3.3)
LYMPHOCYTES # BLD AUTO: 16.7 % — SIGNIFICANT CHANGE UP (ref 13–44)
MCHC RBC-ENTMCNC: 28.9 PG — SIGNIFICANT CHANGE UP (ref 27–34)
MCHC RBC-ENTMCNC: 32.3 G/DL — SIGNIFICANT CHANGE UP (ref 32–36)
MCV RBC AUTO: 89.3 FL — SIGNIFICANT CHANGE UP (ref 80–100)
MONOCYTES # BLD AUTO: 1.12 K/UL — HIGH (ref 0–0.9)
MONOCYTES NFR BLD AUTO: 10.4 % — SIGNIFICANT CHANGE UP (ref 2–14)
NEUTROPHILS # BLD AUTO: 7.69 K/UL — HIGH (ref 1.8–7.4)
NEUTROPHILS NFR BLD AUTO: 71.7 % — SIGNIFICANT CHANGE UP (ref 43–77)
NITRITE UR-MCNC: NEGATIVE — SIGNIFICANT CHANGE UP
PH UR: 6.5 — SIGNIFICANT CHANGE UP (ref 5–8)
PLATELET # BLD AUTO: 590 K/UL — HIGH (ref 150–400)
POTASSIUM SERPL-MCNC: 4.8 MMOL/L — SIGNIFICANT CHANGE UP (ref 3.5–5.3)
POTASSIUM SERPL-SCNC: 4.8 MMOL/L — SIGNIFICANT CHANGE UP (ref 3.5–5.3)
PROT SERPL-MCNC: 7.9 G/DL — SIGNIFICANT CHANGE UP (ref 6.6–8.7)
PROT UR-MCNC: NEGATIVE MG/DL — SIGNIFICANT CHANGE UP
RAPID RVP RESULT: SIGNIFICANT CHANGE UP
RBC # BLD: 5.16 M/UL — SIGNIFICANT CHANGE UP (ref 4.2–5.8)
RBC # FLD: 15.5 % — HIGH (ref 10.3–14.5)
SALICYLATES SERPL-MCNC: <0.6 MG/DL — LOW (ref 10–20)
SARS-COV-2 RNA SPEC QL NAA+PROBE: SIGNIFICANT CHANGE UP
SODIUM SERPL-SCNC: 140 MMOL/L — SIGNIFICANT CHANGE UP (ref 135–145)
SP GR SPEC: >1.03 — HIGH (ref 1–1.03)
TSH SERPL-MCNC: 1.17 UIU/ML — SIGNIFICANT CHANGE UP (ref 0.27–4.2)
UROBILINOGEN FLD QL: 1 MG/DL — SIGNIFICANT CHANGE UP (ref 0.2–1)
WBC # BLD: 10.72 K/UL — HIGH (ref 3.8–10.5)
WBC # FLD AUTO: 10.72 K/UL — HIGH (ref 3.8–10.5)

## 2024-12-31 PROCEDURE — 80053 COMPREHEN METABOLIC PANEL: CPT

## 2024-12-31 PROCEDURE — 0225U NFCT DS DNA&RNA 21 SARSCOV2: CPT

## 2024-12-31 PROCEDURE — 99285 EMERGENCY DEPT VISIT HI MDM: CPT | Mod: 25

## 2024-12-31 PROCEDURE — 80307 DRUG TEST PRSMV CHEM ANLYZR: CPT

## 2024-12-31 PROCEDURE — 71045 X-RAY EXAM CHEST 1 VIEW: CPT | Mod: 26

## 2024-12-31 PROCEDURE — 72040 X-RAY EXAM NECK SPINE 2-3 VW: CPT | Mod: 26

## 2024-12-31 PROCEDURE — 81003 URINALYSIS AUTO W/O SCOPE: CPT

## 2024-12-31 PROCEDURE — 85025 COMPLETE CBC W/AUTO DIFF WBC: CPT

## 2024-12-31 PROCEDURE — 93010 ELECTROCARDIOGRAM REPORT: CPT

## 2024-12-31 PROCEDURE — 72040 X-RAY EXAM NECK SPINE 2-3 VW: CPT

## 2024-12-31 PROCEDURE — 87086 URINE CULTURE/COLONY COUNT: CPT

## 2024-12-31 PROCEDURE — 93005 ELECTROCARDIOGRAM TRACING: CPT

## 2024-12-31 PROCEDURE — 36415 COLL VENOUS BLD VENIPUNCTURE: CPT

## 2024-12-31 PROCEDURE — 99285 EMERGENCY DEPT VISIT HI MDM: CPT

## 2024-12-31 PROCEDURE — 84443 ASSAY THYROID STIM HORMONE: CPT

## 2024-12-31 PROCEDURE — 71045 X-RAY EXAM CHEST 1 VIEW: CPT

## 2024-12-31 RX ORDER — METHOCARBAMOL 500 MG
1000 TABLET ORAL ONCE
Refills: 0 | Status: COMPLETED | OUTPATIENT
Start: 2024-12-31 | End: 2024-12-31

## 2024-12-31 RX ORDER — METHOCARBAMOL 500 MG
2 TABLET ORAL
Qty: 24 | Refills: 0
Start: 2024-12-31 | End: 2025-01-03

## 2024-12-31 RX ORDER — SODIUM CHLORIDE 9 MG/ML
1000 INJECTION, SOLUTION INTRAMUSCULAR; INTRAVENOUS; SUBCUTANEOUS ONCE
Refills: 0 | Status: DISCONTINUED | OUTPATIENT
Start: 2024-12-31 | End: 2024-12-31

## 2024-12-31 RX ADMIN — Medication 1000 MILLIGRAM(S): at 15:43

## 2024-12-31 NOTE — ED ADULT NURSE NOTE - NSFALLUNIVINTERV_ED_ALL_ED
Bed/Stretcher in lowest position, wheels locked, appropriate side rails in place/Call bell, personal items and telephone in reach/Instruct patient to call for assistance before getting out of bed/chair/stretcher/Non-slip footwear applied when patient is off stretcher/[place] to call system/Physically safe environment - no spills, clutter or unnecessary equipment/Purposeful proactive rounding/Room/bathroom lighting operational, light cord in reach

## 2024-12-31 NOTE — ED ADULT NURSE NOTE - AVIAN FLU SYMPTOMS
Medical Week 1 Survey      Responses   Facility patient discharged from?  [facility]   Does the patient have one of the following disease processes/diagnoses(primary or secondary)?  Other   Is there a successful TCM telephone encounter documented?  No   Week 1 attempt successful?  No   Unsuccessful attempts  Attempt 3          Mehreen Reed RN  
No

## 2024-12-31 NOTE — ED PROVIDER NOTE - NSFOLLOWUPINSTRUCTIONS_ED_ALL_ED_FT
You were seen and evaluated in the emergency department for your symptoms.  Your symptoms showed evidence of dehydration.  Please be sure to drink plenty of water throughout the day to keep your kidneys well-hydrated.  Your x-ray did not show evidence of fracture or dislocation.  Please follow your primary care doctor within 2 days to discuss your visit here today as well as discussed etiology of your neck pain.    For your neck spasm please take the prescribed Robaxin as needed for pain every 8 hours. Please do not drive or operate machinery while taking this medication as it can make you sleepy.    Please return to the emergency department for any new or concerning symptoms including but not limited to fever, chills, chest pain, difficulty breathing, loss of function or sensation.

## 2024-12-31 NOTE — ED ADULT NURSE NOTE - OBJECTIVE STATEMENT
pt with reports of neck pain from a chronic injury, chest pain sometimes and feeling like someone is always trying to poison him. pt ambulatory, aox3 but paranoid. steady gait observed.

## 2024-12-31 NOTE — ED ADULT TRIAGE NOTE - CHIEF COMPLAINT QUOTE
BIBEMS c/o neck and upper back pain. EMS reports that the pt called 911 stating that someone is trying to poison him. As per pt he lives alone and states "it just happens because live alone." At baseline as per EMS. BIBEMS c/o neck and upper back pain. EMS reports that the pt called 911 stating that someone is trying to poison him. Pt states "it just happens because live alone." At baseline as per EMS, calm and cooperative in triage.

## 2024-12-31 NOTE — ED PROVIDER NOTE - PATIENT PORTAL LINK FT
You can access the FollowMyHealth Patient Portal offered by [facility] by registering at the following website: [email or web address]. By joining [employer]’s FollowMyHealth portal, you will also be able to view your health information using other applications (apps) compatible with our system.

## 2024-12-31 NOTE — ED PROVIDER NOTE - CLINICAL SUMMARY MEDICAL DECISION MAKING FREE TEXT BOX
HPI: 84-year-old male past medical history of hypertension, hyperlipidemia presents complaining of right-sided neck pain for the last 3 days with difficulty turning his neck to the right.  Patient poor historian.  Denies falls or trauma.  Patient states that he he believes someone is trying to poison him by putting things in his drink.  Patient unable to specify individuals.  States that he lives alone.  Patient denies any loss of function or sensation otherwise.  No other current complaints this time.    ROS:   General: No fever, no chills, no malaise, no fatigue  Respiratory: No cough, no dyspnea, no pleuritic chest pain  Cardiac: no chest pain, no palpitations, no edema, no jvd  Abdomen: No abdominal pain, no nausea, no vomiting, no diarrhea  : No dysuria, no increase frequency, no urgency, No discharge  Musculoskeletal: See HPI  Neurologic: No headache, no vertigo, no paresthesia, no focal deficits  Skin: No rash, no evidence of trauma  All other ROS are negative    PE:  General: A&O x3   Head: NC/AT  Eyes: PERRL, EOMI  ENT: Airway patent, mmm  Pulmonary: CTA b/l, symmetric breath sounds. No W/R/R.  Cardiac: s1s2, RRR, no M,G,R, No JVD  Abdomen: +BS, ND, NT, soft, no guarding, no rebound, no masses , no rigidity  : No CVA TTP, no suprapubic TTP  Back: Normal  spine, right cervical paraspinal and SCM muscle spasm  Extremities: FROM, symmetric pulses, capillary refill < 2 seconds, no edema, 5/5 strength in b/l UE and LE  Skin: no rash or bruising  Neurologic: alert, speech clear, no focal deficits      MDM: 84-year-old male past medical history of hypertension, hyperlipidemia presents complaining of right-sided neck pain for the last 3 days with difficulty turning his neck to the right.  Clinically apparent muscle spasm.  Will provide symptomatic control as needed.  Concern for delirium.  Patient denying SI/HI.  Will obtain CBC, CMP, UA, UC, TSH, serum alcohol/salicylate/acetaminophen.  Consider URI/PNA.  Will obtain RVP/CXR.  Disposition pending results. HPI: 84-year-old male past medical history of hypertension, hyperlipidemia presents complaining of right-sided neck pain for the last 3 days with difficulty turning his neck to the right.  Patient poor historian.  Denies falls or trauma.  Patient states that he he believes someone is trying to poison him by putting things in his drink.  Patient unable to specify individuals.  States that he lives alone.  Patient denies any loss of function or sensation otherwise.  No other current complaints this time.    ROS:   General: No fever, no chills, no malaise, no fatigue  Respiratory: No cough, no dyspnea, no pleuritic chest pain  Cardiac: no chest pain, no palpitations, no edema, no jvd  Abdomen: No abdominal pain, no nausea, no vomiting, no diarrhea  : No dysuria, no increase frequency, no urgency, No discharge  Musculoskeletal: See HPI  Neurologic: No headache, no vertigo, no paresthesia, no focal deficits  Skin: No rash, no evidence of trauma  All other ROS are negative    PE:  General: A&O x3   Head: NC/AT  Eyes: PERRL, EOMI  ENT: Airway patent, mmm  Pulmonary: CTA b/l, symmetric breath sounds. No W/R/R.  Cardiac: s1s2, RRR, no M,G,R, No JVD  Abdomen: +BS, ND, NT, soft, no guarding, no rebound, no masses , no rigidity  : No CVA TTP, no suprapubic TTP  Back: Normal  spine, right cervical paraspinal and SCM muscle spasm  Extremities: FROM, symmetric pulses, capillary refill < 2 seconds, no edema, 5/5 strength in b/l UE and LE  Skin: no rash or bruising  Neurologic: alert, speech clear, no focal deficits      MDM: 84-year-old male past medical history of hypertension, hyperlipidemia presents complaining of right-sided neck pain for the last 3 days with difficulty turning his neck to the right.  Clinically apparent muscle spasm.  Will provide symptomatic control as needed.  Concern for delirium.  Patient denying SI/HI.  Will obtain CBC, CMP, UA, UC, TSH, serum alcohol/salicylate/acetaminophen.  Consider URI/PNA.  Will obtain RVP/CXR.  Disposition pending results.    Independent review of lab results shows no evidence of UTI, mildly elevated white count of 10.7 without left shift.  Metabolic panel shows BUN/creatinine 25.6/1.4 comparable to February 2022 and.  2.1/1.4.  Most recently in September 29, 2024 creatinine 1.24.  Likely mild dehydration.  Offered patient IV hydration, declining IV at this time and requesting to continue hydrating orally.  Patient requesting x-ray of cervical spine.  Discussed low utility for diagnostic yield given chronic injury and need for likely outpatient MRI.  Patient states prior x-ray showed "something."  Will obtain x-ray C-spine. HPI: 84-year-old male past medical history of hypertension, hyperlipidemia presents complaining of right-sided neck pain for the last 3 days with difficulty turning his neck to the right.  Patient poor historian.  Denies falls or trauma.  Patient states that he he believes someone is trying to poison him by putting things in his drink.  Patient unable to specify individuals.  States that he lives alone.  Patient denies any loss of function or sensation otherwise.  No other current complaints this time.    ROS:   General: No fever, no chills, no malaise, no fatigue  Respiratory: No cough, no dyspnea, no pleuritic chest pain  Cardiac: no chest pain, no palpitations, no edema, no jvd  Abdomen: No abdominal pain, no nausea, no vomiting, no diarrhea  : No dysuria, no increase frequency, no urgency, No discharge  Musculoskeletal: See HPI  Neurologic: No headache, no vertigo, no paresthesia, no focal deficits  Skin: No rash, no evidence of trauma  All other ROS are negative    PE:  General: A&O x3   Head: NC/AT  Eyes: PERRL, EOMI  ENT: Airway patent, mmm  Pulmonary: CTA b/l, symmetric breath sounds. No W/R/R.  Cardiac: s1s2, RRR, no M,G,R, No JVD  Abdomen: +BS, ND, NT, soft, no guarding, no rebound, no masses , no rigidity  : No CVA TTP, no suprapubic TTP  Back: Normal  spine, right cervical paraspinal and SCM muscle spasm  Extremities: FROM, symmetric pulses, capillary refill < 2 seconds, no edema, 5/5 strength in b/l UE and LE  Skin: no rash or bruising  Neurologic: alert, speech clear, no focal deficits      MDM: 84-year-old male past medical history of hypertension, hyperlipidemia presents complaining of right-sided neck pain for the last 3 days with difficulty turning his neck to the right.  Clinically apparent muscle spasm.  Will provide symptomatic control as needed.  Concern for delirium.  Patient denying SI/HI.  Will obtain CBC, CMP, UA, UC, TSH, serum alcohol/salicylate/acetaminophen.  Consider URI/PNA.  Will obtain RVP/CXR.  Disposition pending results.    Independent review of lab results shows no evidence of UTI, mildly elevated white count of 10.7 without left shift.  Metabolic panel shows BUN/creatinine 25.6/1.4 comparable to February 2022 and.  2.1/1.4.  Most recently in September 29, 2024 creatinine 1.24.  Likely mild dehydration.  Offered patient IV hydration, declining IV at this time and requesting to continue hydrating orally.  Patient requesting x-ray of cervical spine.  Discussed low utility for diagnostic yield given chronic injury and need for likely outpatient MRI.  Patient states prior x-ray showed "something."  Will obtain x-ray C-spine.    18:30 - Patient symptoms improved the emergency department.  Labs show no evidence of acute infection or urinary tract infection.  Patient denying fear for himself at home.  Patient of sound state of mind at this time.  No current concern for delirium, potential early dementia.  No additional collateral information.  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  Patient no longer wishes to remain emergency department.  Demonstrating capacity to request discharge.

## 2025-01-01 LAB
CULTURE RESULTS: NO GROWTH — SIGNIFICANT CHANGE UP
SPECIMEN SOURCE: SIGNIFICANT CHANGE UP

## 2025-01-07 NOTE — PHYSICAL THERAPY INITIAL EVALUATION ADULT - ADL SKILLS, REHAB EVAL
Called to confirm that patient needs to come in today at noon for admitting. Patient spouse confirmed and verbalized understanding.   
independent

## 2025-04-21 NOTE — ED PROVIDER NOTE - WR ORDER DATE AND TIME 2
General: Appears well and nontoxic  Mentation: A&O x 3  psych: mood appropriate  HEENT: airway patent, conjunctivae clear bilaterally  Resp: symmetric chest rise, no resp distress, breath sounds CTA bilaterally  Cardio: RRR, no m/r/g  GI: soft/nondistended/nontender  : no CVA tenderness  Neuro: sensation and motor function grossly intact  Skin: no cyanosis, no jaundice, no rashes on chest or back  MSK: normal movement of all extremities, left rib tenderness  Lymph/Vasc: no MICHAEL edema
31-Dec-2024 17:25